# Patient Record
Sex: FEMALE | Race: WHITE | Employment: OTHER | URBAN - METROPOLITAN AREA
[De-identification: names, ages, dates, MRNs, and addresses within clinical notes are randomized per-mention and may not be internally consistent; named-entity substitution may affect disease eponyms.]

---

## 2017-01-01 ENCOUNTER — TELEPHONE (OUTPATIENT)
Dept: FAMILY MEDICINE | Facility: CLINIC | Age: 77
End: 2017-01-01

## 2017-01-01 ENCOUNTER — APPOINTMENT (OUTPATIENT)
Dept: CT IMAGING | Facility: CLINIC | Age: 77
DRG: 552 | End: 2017-01-01
Attending: NEUROLOGICAL SURGERY
Payer: MEDICARE

## 2017-01-01 ENCOUNTER — CARE COORDINATION (OUTPATIENT)
Dept: CARE COORDINATION | Facility: CLINIC | Age: 77
End: 2017-01-01

## 2017-01-01 ENCOUNTER — APPOINTMENT (OUTPATIENT)
Dept: GENERAL RADIOLOGY | Facility: CLINIC | Age: 77
DRG: 552 | End: 2017-01-01
Attending: NURSE PRACTITIONER
Payer: MEDICARE

## 2017-01-01 ENCOUNTER — APPOINTMENT (OUTPATIENT)
Dept: GENERAL RADIOLOGY | Facility: CLINIC | Age: 77
DRG: 552 | End: 2017-01-01
Attending: EMERGENCY MEDICINE
Payer: MEDICARE

## 2017-01-01 ENCOUNTER — HOSPITAL ENCOUNTER (INPATIENT)
Facility: CLINIC | Age: 77
LOS: 4 days | Discharge: NURSING, REHAB OR LONG-TERM CARE | DRG: 552 | End: 2017-03-10
Attending: EMERGENCY MEDICINE | Admitting: SURGERY
Payer: MEDICARE

## 2017-01-01 ENCOUNTER — APPOINTMENT (OUTPATIENT)
Dept: OCCUPATIONAL THERAPY | Facility: CLINIC | Age: 77
DRG: 552 | End: 2017-01-01
Attending: NURSE PRACTITIONER
Payer: MEDICARE

## 2017-01-01 ENCOUNTER — APPOINTMENT (OUTPATIENT)
Dept: PHYSICAL THERAPY | Facility: CLINIC | Age: 77
DRG: 552 | End: 2017-01-01
Attending: NURSE PRACTITIONER
Payer: MEDICARE

## 2017-01-01 ENCOUNTER — APPOINTMENT (OUTPATIENT)
Dept: CT IMAGING | Facility: CLINIC | Age: 77
DRG: 552 | End: 2017-01-01
Attending: EMERGENCY MEDICINE
Payer: MEDICARE

## 2017-01-01 ENCOUNTER — APPOINTMENT (OUTPATIENT)
Dept: PHYSICAL THERAPY | Facility: CLINIC | Age: 77
DRG: 552 | End: 2017-01-01
Payer: MEDICARE

## 2017-01-01 ENCOUNTER — DOCUMENTATION ONLY (OUTPATIENT)
Dept: OTHER | Facility: CLINIC | Age: 77
End: 2017-01-01

## 2017-01-01 VITALS
SYSTOLIC BLOOD PRESSURE: 142 MMHG | TEMPERATURE: 97.9 F | WEIGHT: 153.88 LBS | DIASTOLIC BLOOD PRESSURE: 69 MMHG | RESPIRATION RATE: 16 BRPM | HEIGHT: 67 IN | BODY MASS INDEX: 24.15 KG/M2 | OXYGEN SATURATION: 96 % | HEART RATE: 87 BPM

## 2017-01-01 DIAGNOSIS — J45.30 MILD PERSISTENT ASTHMA, UNCOMPLICATED: ICD-10-CM

## 2017-01-01 DIAGNOSIS — K59.01 SLOW TRANSIT CONSTIPATION: ICD-10-CM

## 2017-01-01 DIAGNOSIS — K59.03 DRUG-INDUCED CONSTIPATION: ICD-10-CM

## 2017-01-01 DIAGNOSIS — K21.9 GASTROESOPHAGEAL REFLUX DISEASE WITHOUT ESOPHAGITIS: ICD-10-CM

## 2017-01-01 DIAGNOSIS — M16.0 PRIMARY OSTEOARTHRITIS OF BOTH HIPS: ICD-10-CM
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
## 2017-01-01 DIAGNOSIS — G30.1 LATE ONSET ALZHEIMER'S DISEASE WITH BEHAVIORAL DISTURBANCE (H): ICD-10-CM

## 2017-01-01 DIAGNOSIS — M54.32 SCIATICA OF LEFT SIDE: Primary | ICD-10-CM

## 2017-01-01 DIAGNOSIS — G30.1 LATE ONSET ALZHEIMER'S DISEASE WITH BEHAVIORAL DISTURBANCE (H): Primary | ICD-10-CM

## 2017-01-01 DIAGNOSIS — M79.605 LEFT LEG PAIN: ICD-10-CM

## 2017-01-01 DIAGNOSIS — Z71.89 ACP (ADVANCE CARE PLANNING): Chronic | ICD-10-CM

## 2017-01-01 DIAGNOSIS — F02.818 LATE ONSET ALZHEIMER'S DISEASE WITH BEHAVIORAL DISTURBANCE (H): ICD-10-CM

## 2017-01-01 DIAGNOSIS — I67.9 CEREBROVASCULAR DISEASE: ICD-10-CM

## 2017-01-01 DIAGNOSIS — M79.604 RIGHT LEG PAIN: ICD-10-CM

## 2017-01-01 DIAGNOSIS — S32.049A CLOSED FRACTURE OF FOURTH LUMBAR VERTEBRA, UNSPECIFIED FRACTURE MORPHOLOGY, INITIAL ENCOUNTER (H): ICD-10-CM

## 2017-01-01 DIAGNOSIS — F02.818 LATE ONSET ALZHEIMER'S DISEASE WITH BEHAVIORAL DISTURBANCE (H): Primary | ICD-10-CM

## 2017-01-01 DIAGNOSIS — R42 VERTIGO: ICD-10-CM

## 2017-01-01 DIAGNOSIS — R42 VERTIGO: Primary | ICD-10-CM

## 2017-01-01 DIAGNOSIS — S32.000A COMPRESSION FX, LUMBAR SPINE, CLOSED, INITIAL ENCOUNTER (H): ICD-10-CM

## 2017-01-01 DIAGNOSIS — E55.9 VITAMIN D DEFICIENCY: ICD-10-CM

## 2017-01-01 LAB
ALBUMIN UR-MCNC: NEGATIVE MG/DL
ANION GAP SERPL CALCULATED.3IONS-SCNC: 9 MMOL/L (ref 3–14)
ANION GAP SERPL CALCULATED.3IONS-SCNC: 9 MMOL/L (ref 3–14)
APPEARANCE UR: CLEAR
BACTERIA #/AREA URNS HPF: ABNORMAL /HPF
BASOPHILS # BLD AUTO: 0 10E9/L (ref 0–0.2)
BASOPHILS NFR BLD AUTO: 0.1 %
BILIRUB UR QL STRIP: NEGATIVE
BUN SERPL-MCNC: 17 MG/DL (ref 7–30)
BUN SERPL-MCNC: 19 MG/DL (ref 7–30)
CALCIUM SERPL-MCNC: 9 MG/DL (ref 8.5–10.1)
CALCIUM SERPL-MCNC: 9.2 MG/DL (ref 8.5–10.1)
CHLORIDE SERPL-SCNC: 104 MMOL/L (ref 94–109)
CHLORIDE SERPL-SCNC: 106 MMOL/L (ref 94–109)
CO2 SERPL-SCNC: 26 MMOL/L (ref 20–32)
CO2 SERPL-SCNC: 27 MMOL/L (ref 20–32)
COLOR UR AUTO: ABNORMAL
CREAT SERPL-MCNC: 0.57 MG/DL (ref 0.52–1.04)
CREAT SERPL-MCNC: 0.58 MG/DL (ref 0.52–1.04)
DEPRECATED CALCIDIOL+CALCIFEROL SERPL-MC: 27 UG/L (ref 20–75)
DIFFERENTIAL METHOD BLD: ABNORMAL
EOSINOPHIL # BLD AUTO: 0 10E9/L (ref 0–0.7)
EOSINOPHIL NFR BLD AUTO: 0.1 %
ERYTHROCYTE [DISTWIDTH] IN BLOOD BY AUTOMATED COUNT: 14.4 % (ref 10–15)
ERYTHROCYTE [DISTWIDTH] IN BLOOD BY AUTOMATED COUNT: 14.4 % (ref 10–15)
GFR SERPL CREATININE-BSD FRML MDRD: ABNORMAL ML/MIN/1.7M2
GFR SERPL CREATININE-BSD FRML MDRD: NORMAL ML/MIN/1.7M2
GLUCOSE SERPL-MCNC: 105 MG/DL (ref 70–99)
GLUCOSE SERPL-MCNC: 96 MG/DL (ref 70–99)
GLUCOSE UR STRIP-MCNC: 30 MG/DL
HCT VFR BLD AUTO: 37.9 % (ref 35–47)
HCT VFR BLD AUTO: 37.9 % (ref 35–47)
HGB BLD-MCNC: 12.6 G/DL (ref 11.7–15.7)
HGB BLD-MCNC: 12.6 G/DL (ref 11.7–15.7)
HGB UR QL STRIP: NEGATIVE
IMM GRANULOCYTES # BLD: 0 10E9/L (ref 0–0.4)
IMM GRANULOCYTES NFR BLD: 0.3 %
KETONES UR STRIP-MCNC: NEGATIVE MG/DL
LEUKOCYTE ESTERASE UR QL STRIP: ABNORMAL
LYMPHOCYTES # BLD AUTO: 0.8 10E9/L (ref 0.8–5.3)
LYMPHOCYTES NFR BLD AUTO: 8.6 %
MAGNESIUM SERPL-MCNC: 2.4 MG/DL (ref 1.6–2.3)
MCH RBC QN AUTO: 31 PG (ref 26.5–33)
MCH RBC QN AUTO: 31.1 PG (ref 26.5–33)
MCHC RBC AUTO-ENTMCNC: 33.2 G/DL (ref 31.5–36.5)
MCHC RBC AUTO-ENTMCNC: 33.2 G/DL (ref 31.5–36.5)
MCV RBC AUTO: 93 FL (ref 78–100)
MCV RBC AUTO: 94 FL (ref 78–100)
MONOCYTES # BLD AUTO: 1 10E9/L (ref 0–1.3)
MONOCYTES NFR BLD AUTO: 10.1 %
MUCOUS THREADS #/AREA URNS LPF: PRESENT /LPF
NEUTROPHILS # BLD AUTO: 7.7 10E9/L (ref 1.6–8.3)
NEUTROPHILS NFR BLD AUTO: 80.8 %
NITRATE UR QL: NEGATIVE
NRBC # BLD AUTO: 0 10*3/UL
NRBC BLD AUTO-RTO: 0 /100
PH UR STRIP: 7 PH (ref 5–7)
PHOSPHATE SERPL-MCNC: 2.9 MG/DL (ref 2.5–4.5)
PLATELET # BLD AUTO: 120 10E9/L (ref 150–450)
PLATELET # BLD AUTO: 133 10E9/L (ref 150–450)
PLATELET # BLD AUTO: 135 10E9/L (ref 150–450)
PLATELET # BLD AUTO: 168 10E9/L (ref 150–450)
POTASSIUM SERPL-SCNC: 3.8 MMOL/L (ref 3.4–5.3)
POTASSIUM SERPL-SCNC: 4.1 MMOL/L (ref 3.4–5.3)
RADIOLOGIST FLAGS: ABNORMAL
RBC # BLD AUTO: 4.05 10E12/L (ref 3.8–5.2)
RBC # BLD AUTO: 4.06 10E12/L (ref 3.8–5.2)
RBC #/AREA URNS AUTO: 1 /HPF (ref 0–2)
SODIUM SERPL-SCNC: 140 MMOL/L (ref 133–144)
SODIUM SERPL-SCNC: 141 MMOL/L (ref 133–144)
SP GR UR STRIP: 1.01 (ref 1–1.03)
SQUAMOUS #/AREA URNS AUTO: <1 /HPF (ref 0–1)
TRANS CELLS #/AREA URNS HPF: <1 /HPF (ref 0–1)
URN SPEC COLLECT METH UR: ABNORMAL
UROBILINOGEN UR STRIP-MCNC: NORMAL MG/DL (ref 0–2)
WBC # BLD AUTO: 6.5 10E9/L (ref 4–11)
WBC # BLD AUTO: 9.5 10E9/L (ref 4–11)
WBC #/AREA URNS AUTO: 2 /HPF (ref 0–2)

## 2017-01-01 PROCEDURE — 12000003 ZZH R&B CRITICAL UMMC

## 2017-01-01 PROCEDURE — 25000132 ZZH RX MED GY IP 250 OP 250 PS 637: Mod: GY | Performed by: NURSE PRACTITIONER

## 2017-01-01 PROCEDURE — 36415 COLL VENOUS BLD VENIPUNCTURE: CPT | Performed by: NURSE PRACTITIONER

## 2017-01-01 PROCEDURE — 40000133 ZZH STATISTIC OT WARD VISIT: Performed by: OCCUPATIONAL THERAPIST

## 2017-01-01 PROCEDURE — 40000193 ZZH STATISTIC PT WARD VISIT

## 2017-01-01 PROCEDURE — 72131 CT LUMBAR SPINE W/O DYE: CPT

## 2017-01-01 PROCEDURE — 40000164 ZZH STATISTIC PHYSICIAN TLC VISIT: Performed by: INTERNAL MEDICINE

## 2017-01-01 PROCEDURE — 70450 CT HEAD/BRAIN W/O DYE: CPT

## 2017-01-01 PROCEDURE — 97530 THERAPEUTIC ACTIVITIES: CPT | Mod: GP

## 2017-01-01 PROCEDURE — 99207 ZZC CDG-CORRECTLY CODED, REVIEWED AND AGREE: CPT | Performed by: INTERNAL MEDICINE

## 2017-01-01 PROCEDURE — 25000128 H RX IP 250 OP 636: Performed by: NURSE PRACTITIONER

## 2017-01-01 PROCEDURE — 72125 CT NECK SPINE W/O DYE: CPT

## 2017-01-01 PROCEDURE — 73560 X-RAY EXAM OF KNEE 1 OR 2: CPT | Mod: 50

## 2017-01-01 PROCEDURE — A9270 NON-COVERED ITEM OR SERVICE: HCPCS | Mod: GY | Performed by: NURSE PRACTITIONER

## 2017-01-01 PROCEDURE — 72170 X-RAY EXAM OF PELVIS: CPT

## 2017-01-01 PROCEDURE — 85049 AUTOMATED PLATELET COUNT: CPT | Performed by: NURSE PRACTITIONER

## 2017-01-01 PROCEDURE — 85025 COMPLETE CBC W/AUTO DIFF WBC: CPT | Performed by: EMERGENCY MEDICINE

## 2017-01-01 PROCEDURE — 84100 ASSAY OF PHOSPHORUS: CPT | Performed by: NURSE PRACTITIONER

## 2017-01-01 PROCEDURE — 80048 BASIC METABOLIC PNL TOTAL CA: CPT | Performed by: NURSE PRACTITIONER

## 2017-01-01 PROCEDURE — 73590 X-RAY EXAM OF LOWER LEG: CPT | Mod: 50

## 2017-01-01 PROCEDURE — 99222 1ST HOSP IP/OBS MODERATE 55: CPT | Performed by: INTERNAL MEDICINE

## 2017-01-01 PROCEDURE — 97535 SELF CARE MNGMENT TRAINING: CPT | Mod: GO | Performed by: OCCUPATIONAL THERAPIST

## 2017-01-01 PROCEDURE — 81001 URINALYSIS AUTO W/SCOPE: CPT | Performed by: EMERGENCY MEDICINE

## 2017-01-01 PROCEDURE — 85027 COMPLETE CBC AUTOMATED: CPT | Performed by: NURSE PRACTITIONER

## 2017-01-01 PROCEDURE — 83735 ASSAY OF MAGNESIUM: CPT | Performed by: NURSE PRACTITIONER

## 2017-01-01 PROCEDURE — 97166 OT EVAL MOD COMPLEX 45 MIN: CPT | Mod: GO | Performed by: OCCUPATIONAL THERAPIST

## 2017-01-01 PROCEDURE — 99285 EMERGENCY DEPT VISIT HI MDM: CPT | Mod: 25 | Performed by: EMERGENCY MEDICINE

## 2017-01-01 PROCEDURE — 80048 BASIC METABOLIC PNL TOTAL CA: CPT | Performed by: EMERGENCY MEDICINE

## 2017-01-01 PROCEDURE — 99285 EMERGENCY DEPT VISIT HI MDM: CPT | Mod: Z6 | Performed by: EMERGENCY MEDICINE

## 2017-01-01 PROCEDURE — 97161 PT EVAL LOW COMPLEX 20 MIN: CPT | Mod: GP

## 2017-01-01 PROCEDURE — 73552 X-RAY EXAM OF FEMUR 2/>: CPT | Mod: 50

## 2017-01-01 PROCEDURE — 82306 VITAMIN D 25 HYDROXY: CPT | Performed by: NURSE PRACTITIONER

## 2017-01-01 PROCEDURE — 72100 X-RAY EXAM L-S SPINE 2/3 VWS: CPT

## 2017-01-01 RX ORDER — CLONAZEPAM 0.5 MG/1
0.5 TABLET ORAL EVERY 8 HOURS PRN
Status: DISCONTINUED | OUTPATIENT
Start: 2017-01-01 | End: 2017-01-01 | Stop reason: HOSPADM

## 2017-01-01 RX ORDER — POLYETHYLENE GLYCOL 3350 17 G/17G
17 POWDER, FOR SOLUTION ORAL DAILY
Status: DISCONTINUED | OUTPATIENT
Start: 2017-01-01 | End: 2017-01-01 | Stop reason: HOSPADM

## 2017-01-01 RX ORDER — ACETAMINOPHEN 325 MG/1
975 TABLET ORAL EVERY 8 HOURS
Status: DISCONTINUED | OUTPATIENT
Start: 2017-01-01 | End: 2017-01-01 | Stop reason: HOSPADM

## 2017-01-01 RX ORDER — ONDANSETRON 2 MG/ML
4 INJECTION INTRAMUSCULAR; INTRAVENOUS EVERY 6 HOURS PRN
Status: DISCONTINUED | OUTPATIENT
Start: 2017-01-01 | End: 2017-01-01 | Stop reason: HOSPADM

## 2017-01-01 RX ORDER — MAGNESIUM SULFATE HEPTAHYDRATE 40 MG/ML
4 INJECTION, SOLUTION INTRAVENOUS EVERY 4 HOURS PRN
Status: DISCONTINUED | OUTPATIENT
Start: 2017-01-01 | End: 2017-01-01 | Stop reason: HOSPADM

## 2017-01-01 RX ORDER — AMOXICILLIN 250 MG
2 CAPSULE ORAL 2 TIMES DAILY
Status: DISCONTINUED | OUTPATIENT
Start: 2017-01-01 | End: 2017-01-01 | Stop reason: HOSPADM

## 2017-01-01 RX ORDER — LIDOCAINE 50 MG/G
1-3 PATCH TOPICAL
Status: DISCONTINUED | OUTPATIENT
Start: 2017-01-01 | End: 2017-01-01 | Stop reason: HOSPADM

## 2017-01-01 RX ORDER — MEMANTINE HYDROCHLORIDE 10 MG/1
10 TABLET ORAL 2 TIMES DAILY
Qty: 180 TABLET | Refills: 3 | DISCHARGE
Start: 2017-01-01

## 2017-01-01 RX ORDER — OXYCODONE HYDROCHLORIDE 5 MG/1
5-10 TABLET ORAL EVERY 4 HOURS PRN
Qty: 60 TABLET | Refills: 0 | Status: SHIPPED | DISCHARGE
Start: 2017-01-01

## 2017-01-01 RX ORDER — CLONAZEPAM 0.5 MG/1
0.25 TABLET ORAL 2 TIMES DAILY PRN
Status: DISCONTINUED | OUTPATIENT
Start: 2017-01-01 | End: 2017-01-01

## 2017-01-01 RX ORDER — POLYETHYLENE GLYCOL 3350 17 G/17G
17 POWDER, FOR SOLUTION ORAL DAILY
Qty: 500 G | Refills: 11 | DISCHARGE
Start: 2017-01-01

## 2017-01-01 RX ORDER — ALBUTEROL SULFATE 90 UG/1
2 AEROSOL, METERED RESPIRATORY (INHALATION) EVERY 4 HOURS PRN
Status: DISCONTINUED | OUTPATIENT
Start: 2017-01-01 | End: 2017-01-01 | Stop reason: HOSPADM

## 2017-01-01 RX ORDER — GABAPENTIN 300 MG/1
300 CAPSULE ORAL AT BEDTIME
Status: DISCONTINUED | OUTPATIENT
Start: 2017-01-01 | End: 2017-01-01 | Stop reason: HOSPADM

## 2017-01-01 RX ORDER — ACETAMINOPHEN 325 MG/1
975 TABLET ORAL EVERY 8 HOURS
Qty: 100 TABLET | DISCHARGE
Start: 2017-01-01

## 2017-01-01 RX ORDER — MONTELUKAST SODIUM 10 MG/1
1 TABLET ORAL DAILY
Qty: 90 TABLET | Refills: 3 | DISCHARGE
Start: 2017-01-01

## 2017-01-01 RX ORDER — ONDANSETRON 4 MG/1
4-8 TABLET, ORALLY DISINTEGRATING ORAL EVERY 8 HOURS PRN
Qty: 30 TABLET | Refills: 0 | DISCHARGE
Start: 2017-01-01

## 2017-01-01 RX ORDER — CLONAZEPAM 0.5 MG/1
0.5 TABLET ORAL ONCE
Status: COMPLETED | OUTPATIENT
Start: 2017-01-01 | End: 2017-01-01

## 2017-01-01 RX ORDER — MECLIZINE HYDROCHLORIDE 25 MG/1
25 TABLET ORAL 3 TIMES DAILY PRN
Status: DISCONTINUED | OUTPATIENT
Start: 2017-01-01 | End: 2017-01-01 | Stop reason: HOSPADM

## 2017-01-01 RX ORDER — CALCITONIN SALMON 200 [IU]/.09ML
1 SPRAY, METERED NASAL DAILY
DISCHARGE
Start: 2017-01-01

## 2017-01-01 RX ORDER — ONDANSETRON 4 MG/1
4 TABLET, ORALLY DISINTEGRATING ORAL EVERY 6 HOURS PRN
Status: DISCONTINUED | OUTPATIENT
Start: 2017-01-01 | End: 2017-01-01 | Stop reason: HOSPADM

## 2017-01-01 RX ORDER — NALOXONE HYDROCHLORIDE 0.4 MG/ML
.1-.4 INJECTION, SOLUTION INTRAMUSCULAR; INTRAVENOUS; SUBCUTANEOUS
Status: DISCONTINUED | OUTPATIENT
Start: 2017-01-01 | End: 2017-01-01 | Stop reason: HOSPADM

## 2017-01-01 RX ORDER — HEPARIN SODIUM 5000 [USP'U]/.5ML
5000 INJECTION, SOLUTION INTRAVENOUS; SUBCUTANEOUS EVERY 12 HOURS
Status: DISCONTINUED | OUTPATIENT
Start: 2017-01-01 | End: 2017-01-01 | Stop reason: HOSPADM

## 2017-01-01 RX ORDER — MEMANTINE HYDROCHLORIDE 5 MG/1
10 TABLET ORAL 2 TIMES DAILY
Status: DISCONTINUED | OUTPATIENT
Start: 2017-01-01 | End: 2017-01-01 | Stop reason: HOSPADM

## 2017-01-01 RX ORDER — CLONAZEPAM 0.5 MG/1
0.5 TABLET ORAL 3 TIMES DAILY PRN
Qty: 60 TABLET | Refills: 0 | Status: SHIPPED | DISCHARGE
Start: 2017-01-01

## 2017-01-01 RX ORDER — BISACODYL 10 MG
10 SUPPOSITORY, RECTAL RECTAL DAILY PRN
Status: DISCONTINUED | OUTPATIENT
Start: 2017-01-01 | End: 2017-01-01

## 2017-01-01 RX ORDER — GABAPENTIN 300 MG/1
300 CAPSULE ORAL AT BEDTIME
Qty: 60 CAPSULE | DISCHARGE
Start: 2017-01-01

## 2017-01-01 RX ORDER — ALUMINA, MAGNESIA, AND SIMETHICONE 2400; 2400; 240 MG/30ML; MG/30ML; MG/30ML
30 SUSPENSION ORAL EVERY 4 HOURS PRN
Status: DISCONTINUED | OUTPATIENT
Start: 2017-01-01 | End: 2017-01-01 | Stop reason: HOSPADM

## 2017-01-01 RX ORDER — HALOPERIDOL 5 MG/ML
2 INJECTION INTRAMUSCULAR EVERY 6 HOURS PRN
Status: DISCONTINUED | OUTPATIENT
Start: 2017-01-01 | End: 2017-01-01 | Stop reason: HOSPADM

## 2017-01-01 RX ORDER — OXYCODONE HYDROCHLORIDE 5 MG/1
5 TABLET ORAL EVERY 4 HOURS PRN
Status: DISCONTINUED | OUTPATIENT
Start: 2017-01-01 | End: 2017-01-01

## 2017-01-01 RX ORDER — ALBUTEROL SULFATE 90 UG/1
2 AEROSOL, METERED RESPIRATORY (INHALATION) EVERY 4 HOURS PRN
Qty: 1 INHALER | Refills: 11 | DISCHARGE
Start: 2017-01-01

## 2017-01-01 RX ORDER — ALUMINA, MAGNESIA, AND SIMETHICONE 2400; 2400; 240 MG/30ML; MG/30ML; MG/30ML
30 SUSPENSION ORAL EVERY 4 HOURS PRN
Qty: 769 ML | Refills: 11 | DISCHARGE
Start: 2017-01-01

## 2017-01-01 RX ORDER — BISACODYL 10 MG
10 SUPPOSITORY, RECTAL RECTAL DAILY
Qty: 30 SUPPOSITORY | DISCHARGE
Start: 2017-01-01

## 2017-01-01 RX ORDER — LIDOCAINE 50 MG/G
2 PATCH TOPICAL EVERY 24 HOURS
Qty: 30 PATCH | DISCHARGE
Start: 2017-01-01

## 2017-01-01 RX ORDER — POTASSIUM CHLORIDE 29.8 MG/ML
20 INJECTION INTRAVENOUS
Status: DISCONTINUED | OUTPATIENT
Start: 2017-01-01 | End: 2017-01-01 | Stop reason: HOSPADM

## 2017-01-01 RX ORDER — OXYCODONE HYDROCHLORIDE 5 MG/1
5-10 TABLET ORAL EVERY 4 HOURS PRN
Status: DISCONTINUED | OUTPATIENT
Start: 2017-01-01 | End: 2017-01-01 | Stop reason: HOSPADM

## 2017-01-01 RX ORDER — CLONAZEPAM 0.5 MG/1
0.25 TABLET ORAL 2 TIMES DAILY PRN
Qty: 90 TABLET | Refills: 0 | Status: ON HOLD | OUTPATIENT
Start: 2017-01-01 | End: 2017-01-01

## 2017-01-01 RX ORDER — HALOPERIDOL 5 MG/ML
2 INJECTION INTRAMUSCULAR
Status: DISCONTINUED | OUTPATIENT
Start: 2017-01-01 | End: 2017-01-01

## 2017-01-01 RX ORDER — MECLIZINE HYDROCHLORIDE 25 MG/1
TABLET ORAL
Qty: 90 TABLET | Refills: 0 | Status: ON HOLD | OUTPATIENT
Start: 2017-01-01 | End: 2017-01-01

## 2017-01-01 RX ORDER — AMOXICILLIN 250 MG
2 CAPSULE ORAL 2 TIMES DAILY
Qty: 180 TABLET | Refills: 3 | DISCHARGE
Start: 2017-01-01

## 2017-01-01 RX ORDER — BISACODYL 10 MG
10 SUPPOSITORY, RECTAL RECTAL DAILY
Status: DISCONTINUED | OUTPATIENT
Start: 2017-01-01 | End: 2017-01-01 | Stop reason: HOSPADM

## 2017-01-01 RX ORDER — POTASSIUM CHLORIDE 750 MG/1
20-40 TABLET, EXTENDED RELEASE ORAL
Status: DISCONTINUED | OUTPATIENT
Start: 2017-01-01 | End: 2017-01-01 | Stop reason: HOSPADM

## 2017-01-01 RX ORDER — CLONAZEPAM 0.5 MG/1
0.25 TABLET ORAL EVERY 8 HOURS PRN
Status: DISCONTINUED | OUTPATIENT
Start: 2017-01-01 | End: 2017-01-01

## 2017-01-01 RX ORDER — POTASSIUM CHLORIDE 7.45 MG/ML
10 INJECTION INTRAVENOUS
Status: DISCONTINUED | OUTPATIENT
Start: 2017-01-01 | End: 2017-01-01 | Stop reason: HOSPADM

## 2017-01-01 RX ORDER — HEPARIN SODIUM 5000 [USP'U]/.5ML
5000 INJECTION, SOLUTION INTRAVENOUS; SUBCUTANEOUS EVERY 12 HOURS
Qty: 14 SYRINGE | DISCHARGE
Start: 2017-01-01

## 2017-01-01 RX ORDER — MECLIZINE HYDROCHLORIDE 25 MG/1
25 TABLET ORAL 3 TIMES DAILY PRN
Qty: 90 TABLET | Refills: 3 | DISCHARGE
Start: 2017-01-01

## 2017-01-01 RX ORDER — POTASSIUM CHLORIDE 1.5 G/1.58G
20-40 POWDER, FOR SOLUTION ORAL
Status: DISCONTINUED | OUTPATIENT
Start: 2017-01-01 | End: 2017-01-01 | Stop reason: HOSPADM

## 2017-01-01 RX ORDER — MONTELUKAST SODIUM 10 MG/1
10 TABLET ORAL DAILY
Status: DISCONTINUED | OUTPATIENT
Start: 2017-01-01 | End: 2017-01-01 | Stop reason: HOSPADM

## 2017-01-01 RX ORDER — PHENOL 1.4 %
600 AEROSOL, SPRAY (ML) MUCOUS MEMBRANE DAILY
Qty: 60 TABLET | DISCHARGE
Start: 2017-01-01 | End: 2017-01-01

## 2017-01-01 RX ORDER — CALCIUM CARBONATE 500(1250)
1250 TABLET ORAL 2 TIMES DAILY WITH MEALS
Status: DISCONTINUED | OUTPATIENT
Start: 2017-01-01 | End: 2017-01-01 | Stop reason: HOSPADM

## 2017-01-01 RX ORDER — BISACODYL 10 MG
10 SUPPOSITORY, RECTAL RECTAL ONCE
Status: COMPLETED | OUTPATIENT
Start: 2017-01-01 | End: 2017-01-01

## 2017-01-01 RX ORDER — CALCITONIN SALMON 200 [IU]/.09ML
1 SPRAY, METERED NASAL DAILY
Status: DISCONTINUED | OUTPATIENT
Start: 2017-01-01 | End: 2017-01-01 | Stop reason: HOSPADM

## 2017-01-01 RX ADMIN — ACETAMINOPHEN 975 MG: 325 TABLET, FILM COATED ORAL at 10:00

## 2017-01-01 RX ADMIN — POLYETHYLENE GLYCOL 3350 17 G: 17 POWDER, FOR SOLUTION ORAL at 08:39

## 2017-01-01 RX ADMIN — POLYETHYLENE GLYCOL 3350 17 G: 17 POWDER, FOR SOLUTION ORAL at 09:01

## 2017-01-01 RX ADMIN — OMEPRAZOLE 20 MG: 20 CAPSULE, DELAYED RELEASE ORAL at 09:02

## 2017-01-01 RX ADMIN — TRAMADOL HYDROCHLORIDE 25 MG: 50 TABLET, FILM COATED ORAL at 10:41

## 2017-01-01 RX ADMIN — HEPARIN SODIUM 5000 UNITS: 5000 INJECTION, SOLUTION INTRAVENOUS; SUBCUTANEOUS at 06:09

## 2017-01-01 RX ADMIN — TRAMADOL HYDROCHLORIDE 50 MG: 50 TABLET, FILM COATED ORAL at 14:37

## 2017-01-01 RX ADMIN — ACETAMINOPHEN 975 MG: 325 TABLET, FILM COATED ORAL at 09:02

## 2017-01-01 RX ADMIN — SENNOSIDES AND DOCUSATE SODIUM 2 TABLET: 8.6; 5 TABLET ORAL at 19:29

## 2017-01-01 RX ADMIN — TRAMADOL HYDROCHLORIDE 25 MG: 50 TABLET, FILM COATED ORAL at 08:35

## 2017-01-01 RX ADMIN — MEMANTINE HYDROCHLORIDE 10 MG: 5 TABLET, FILM COATED ORAL at 19:29

## 2017-01-01 RX ADMIN — BISACODYL 10 MG: 10 SUPPOSITORY RECTAL at 10:26

## 2017-01-01 RX ADMIN — MONTELUKAST SODIUM 10 MG: 10 TABLET, FILM COATED ORAL at 07:45

## 2017-01-01 RX ADMIN — TRAMADOL HYDROCHLORIDE 25 MG: 50 TABLET, FILM COATED ORAL at 22:30

## 2017-01-01 RX ADMIN — OXYCODONE HYDROCHLORIDE 5 MG: 5 TABLET ORAL at 14:07

## 2017-01-01 RX ADMIN — HEPARIN SODIUM 5000 UNITS: 5000 INJECTION, SOLUTION INTRAVENOUS; SUBCUTANEOUS at 20:35

## 2017-01-01 RX ADMIN — HEPARIN SODIUM 5000 UNITS: 5000 INJECTION, SOLUTION INTRAVENOUS; SUBCUTANEOUS at 18:12

## 2017-01-01 RX ADMIN — MEMANTINE HYDROCHLORIDE 10 MG: 5 TABLET, FILM COATED ORAL at 08:39

## 2017-01-01 RX ADMIN — TRAMADOL HYDROCHLORIDE 50 MG: 50 TABLET, FILM COATED ORAL at 04:17

## 2017-01-01 RX ADMIN — ACETAMINOPHEN 975 MG: 325 TABLET, FILM COATED ORAL at 16:38

## 2017-01-01 RX ADMIN — Medication 1 TABLET: at 19:29

## 2017-01-01 RX ADMIN — HEPARIN SODIUM 5000 UNITS: 5000 INJECTION, SOLUTION INTRAVENOUS; SUBCUTANEOUS at 17:13

## 2017-01-01 RX ADMIN — HEPARIN SODIUM 5000 UNITS: 5000 INJECTION, SOLUTION INTRAVENOUS; SUBCUTANEOUS at 04:43

## 2017-01-01 RX ADMIN — OXYCODONE HYDROCHLORIDE 5 MG: 5 TABLET ORAL at 15:12

## 2017-01-01 RX ADMIN — HEPARIN SODIUM 5000 UNITS: 5000 INJECTION, SOLUTION INTRAVENOUS; SUBCUTANEOUS at 17:22

## 2017-01-01 RX ADMIN — ACETAMINOPHEN 975 MG: 325 TABLET, FILM COATED ORAL at 00:10

## 2017-01-01 RX ADMIN — SENNOSIDES AND DOCUSATE SODIUM 2 TABLET: 8.6; 5 TABLET ORAL at 20:34

## 2017-01-01 RX ADMIN — GABAPENTIN 300 MG: 300 CAPSULE ORAL at 20:34

## 2017-01-01 RX ADMIN — SENNOSIDES AND DOCUSATE SODIUM 2 TABLET: 8.6; 5 TABLET ORAL at 09:01

## 2017-01-01 RX ADMIN — OXYCODONE HYDROCHLORIDE 5 MG: 5 TABLET ORAL at 10:26

## 2017-01-01 RX ADMIN — ACETAMINOPHEN 975 MG: 325 TABLET, FILM COATED ORAL at 08:35

## 2017-01-01 RX ADMIN — CLONAZEPAM 0.25 MG: 0.5 TABLET ORAL at 08:49

## 2017-01-01 RX ADMIN — CLONAZEPAM 0.25 MG: 0.5 TABLET ORAL at 16:36

## 2017-01-01 RX ADMIN — ACETAMINOPHEN 975 MG: 325 TABLET, FILM COATED ORAL at 17:22

## 2017-01-01 RX ADMIN — Medication 1 TABLET: at 08:39

## 2017-01-01 RX ADMIN — VITAMIN D, TAB 1000IU (100/BT) 2000 UNITS: 25 TAB at 09:02

## 2017-01-01 RX ADMIN — LIDOCAINE 2 PATCH: 50 PATCH TOPICAL at 20:35

## 2017-01-01 RX ADMIN — CLONAZEPAM 0.5 MG: 0.5 TABLET ORAL at 22:40

## 2017-01-01 RX ADMIN — POLYETHYLENE GLYCOL 3350 17 G: 17 POWDER, FOR SOLUTION ORAL at 07:54

## 2017-01-01 RX ADMIN — HEPARIN SODIUM 5000 UNITS: 5000 INJECTION, SOLUTION INTRAVENOUS; SUBCUTANEOUS at 04:23

## 2017-01-01 RX ADMIN — MONTELUKAST SODIUM 10 MG: 10 TABLET, FILM COATED ORAL at 08:39

## 2017-01-01 RX ADMIN — CLONAZEPAM 0.25 MG: 0.5 TABLET ORAL at 07:06

## 2017-01-01 RX ADMIN — DOCUSATE SODIUM 286 ML: 50 LIQUID ORAL at 13:57

## 2017-01-01 RX ADMIN — CLONAZEPAM 0.5 MG: 0.5 TABLET ORAL at 12:50

## 2017-01-01 RX ADMIN — MEMANTINE HYDROCHLORIDE 10 MG: 5 TABLET, FILM COATED ORAL at 09:02

## 2017-01-01 RX ADMIN — MEMANTINE HYDROCHLORIDE 10 MG: 5 TABLET, FILM COATED ORAL at 20:35

## 2017-01-01 RX ADMIN — VITAMIN D, TAB 1000IU (100/BT) 2000 UNITS: 25 TAB at 16:38

## 2017-01-01 RX ADMIN — SENNOSIDES AND DOCUSATE SODIUM 2 TABLET: 8.6; 5 TABLET ORAL at 08:39

## 2017-01-01 RX ADMIN — HEPARIN SODIUM 5000 UNITS: 5000 INJECTION, SOLUTION INTRAVENOUS; SUBCUTANEOUS at 09:03

## 2017-01-01 RX ADMIN — CALCIUM 1250 MG: 500 TABLET ORAL at 20:34

## 2017-01-01 RX ADMIN — SENNOSIDES AND DOCUSATE SODIUM 2 TABLET: 8.6; 5 TABLET ORAL at 07:45

## 2017-01-01 RX ADMIN — MEMANTINE HYDROCHLORIDE 10 MG: 5 TABLET, FILM COATED ORAL at 07:45

## 2017-01-01 RX ADMIN — LIDOCAINE 2 PATCH: 50 PATCH TOPICAL at 20:39

## 2017-01-01 RX ADMIN — OXYCODONE HYDROCHLORIDE 5 MG: 5 TABLET ORAL at 20:34

## 2017-01-01 RX ADMIN — ACETAMINOPHEN 375 MG: 325 TABLET, FILM COATED ORAL at 16:36

## 2017-01-01 RX ADMIN — ACETAMINOPHEN 975 MG: 325 TABLET, FILM COATED ORAL at 16:47

## 2017-01-01 RX ADMIN — OMEPRAZOLE 20 MG: 20 CAPSULE, DELAYED RELEASE ORAL at 08:39

## 2017-01-01 RX ADMIN — LIDOCAINE 3 PATCH: 50 PATCH TOPICAL at 19:29

## 2017-01-01 RX ADMIN — ACETAMINOPHEN 975 MG: 325 TABLET, FILM COATED ORAL at 10:14

## 2017-01-01 RX ADMIN — MEMANTINE HYDROCHLORIDE 10 MG: 5 TABLET, FILM COATED ORAL at 22:34

## 2017-01-01 RX ADMIN — CALCIUM 1250 MG: 500 TABLET ORAL at 09:03

## 2017-01-01 RX ADMIN — OXYCODONE HYDROCHLORIDE 5 MG: 5 TABLET ORAL at 09:50

## 2017-01-01 RX ADMIN — MONTELUKAST SODIUM 10 MG: 10 TABLET, FILM COATED ORAL at 09:03

## 2017-01-01 RX ADMIN — BISACODYL 10 MG: 10 SUPPOSITORY RECTAL at 09:01

## 2017-01-01 RX ADMIN — CLONAZEPAM 0.25 MG: 0.5 TABLET ORAL at 00:19

## 2017-01-01 RX ADMIN — OMEPRAZOLE 20 MG: 20 CAPSULE, DELAYED RELEASE ORAL at 07:45

## 2017-01-01 RX ADMIN — TRAMADOL HYDROCHLORIDE 25 MG: 50 TABLET, FILM COATED ORAL at 04:43

## 2017-01-01 RX ADMIN — CLONAZEPAM 0.25 MG: 0.5 TABLET ORAL at 18:00

## 2017-01-01 RX ADMIN — TRAMADOL HYDROCHLORIDE 25 MG: 50 TABLET, FILM COATED ORAL at 20:35

## 2017-01-01 ASSESSMENT — PAIN DESCRIPTION - DESCRIPTORS
DESCRIPTORS: SORE
DESCRIPTORS: SORE

## 2017-01-01 ASSESSMENT — ENCOUNTER SYMPTOMS
DYSURIA: 0
ARTHRALGIAS: 1
CONFUSION: 1
BACK PAIN: 1

## 2017-01-03 NOTE — TELEPHONE ENCOUNTER
[facility] nurse called requesting verbal approval to discontinue Rx for multivitamin. Also asking for clarifications on dose for tylenol directions read take 1-2 tablets 500 mg every 8 hous PRN. Facility doesn't  do dose ranges need to know if 1 or two tablets and how often. Per chart review tylenol not on active list. Please Advice.

## 2017-01-03 NOTE — TELEPHONE ENCOUNTER
Pt family mem would like to DC multivitamin, therapeutic with minerals (MULTI-VITAMIN) TABS due to only being able to have 8 medication at a time.  Advise to have home care fax over DC request.    Karen Rinaldi, TC

## 2017-01-03 NOTE — TELEPHONE ENCOUNTER
Verbal approval for discontinue multivitamin approved. Tylenol (acetaminophen) may be given at 500mg 1 tab every 4 hours as needed for pain or fever. Please advise them. Thank you! Nicole Joy Siegler, PA-C

## 2017-01-04 NOTE — TELEPHONE ENCOUNTER
Reason for Call:  Form, our goal is to have forms completed with 72 hours, however, some forms may require a visit or additional information.    Type of letter, form or note:  medical     Who is the form from?: [facility]    Where did the form come from: form was faxed in    What clinic location was the form placed at?: [facility]    Where the form was placed: Inbox of Nicole Siegler, PA     What number is listed as a contact on the form?: Fax # [phone]       Additional comments: D/C Multivitamin & Start Acetaminophen 500 mg    Call taken on 1/4/2017 at 12:43 PM by Sam Schaeffer

## 2017-01-10 NOTE — TELEPHONE ENCOUNTER
clonazePAM (KLONOPIN) 0.5 MG tablet      Last Written Prescription Date:  10/14/16  Last Fill Quantity: 90,   # refills: 0  Last Office Visit with [facility], [facility] or [facility] prescribing provider: 12/28/16  Future Office visit:       Routing refill request to provider for review/approval because:  Drug not on the [facility], [facility] or [facility] refill protocol or controlled substance

## 2017-01-11 NOTE — TELEPHONE ENCOUNTER
antivert Routing refill request to provider for review/approval because:  Checking if frequency of useis ok with Dr. Jose Alberto Muniz             Last Written Prescription Date: 10-14-16  Last Fill Quantity: 90,  # refills: 3   Last Office Visit with [facility], [facility] or [facility] prescribing provider: 12-28-16

## 2017-01-25 NOTE — TELEPHONE ENCOUNTER
Reason for call:  Patient reporting a symptom    Symptom or request: Sore throat, hoarse voice, temp 99.6     Duration (how long have symptoms been present): 1 day    Have you been treated for this before? No    Additional comments: Nurse is calling to find out what to do. Please call    Phone Number patient can be reached at:  Other phone number: [phone]    Best Time:  any    Can we leave a detailed message on this number:  YES    Call taken on 1/25/2017 at 10:36 AM by MARY MENA

## 2017-01-26 NOTE — TELEPHONE ENCOUNTER
Patient was called. Nurse Marilyn reports pt was seen at  yesterday.   Pt's POA was informed pt has appt scheduled today. She was transferred to [facility] to cancel appt.

## 2017-03-06 PROBLEM — S32.040A COMPRESSION FRACTURE OF L4 LUMBAR VERTEBRA: Status: ACTIVE | Noted: 2017-01-01

## 2017-03-06 NOTE — ED NOTES
BIBA, had a fall sometime over weekend. C/o low back pain and maybe right hip pain. LIves in an assisted care for dementia.

## 2017-03-06 NOTE — IP AVS SNAPSHOT
` `     [address] [facility] BANK: [phone]            Medication Administration Report for Tasha Rob as of 03/10/17 8310   Legend:    Given Hold Not Given Due Canceled Entry Other Actions    Time Time (Time) Time  Time-Action       Inactive    Active    Linked        Medications 03/04/17 03/05/17 03/06/17 03/07/17 03/08/17 03/09/17 03/10/17    acetaminophen (TYLENOL) tablet 975 mg  Dose: 975 mg Freq: EVERY 8 HOURS Route: PO  Start: 03/06/17 1700   Admin Instructions: Alternate ibuprofen (if ordered) with acetaminophen.  Maximum acetaminophen dose from all sources = 75 mg/kg/day not to exceed 4 grams/day.       1722 (975 mg)-Given        0010 (975 mg)-Given       1000 (975 mg)-Given       1636 (375 mg)-Given [C]        (0238)-Not Given              1014 (975 mg)-Given       1647 (975 mg)-Given        (0200)-Not Given       0835 (975 mg)-Given       1638 (975 mg)-Given        (0257)-Not Given       0902 (975 mg)-Given       [ ] 1700           albuterol (PROAIR HFA/PROVENTIL HFA/VENTOLIN HFA) Inhaler 2 puff  Dose: 2 puff Freq: EVERY 4 HOURS PRN Route: IN  PRN Reasons: shortness of breath / dyspnea,wheezing  Start: 03/06/17 1644              alum & mag hydroxide-simethicone (MYLANTA ES/MAALOX  ES) suspension 30 mL  Dose: 30 mL Freq: EVERY 4 HOURS PRN Route: PO  PRN Reasons: indigestion,heartburn  Start: 03/06/17 1644   Admin Instructions: Shake well.               bisacodyl (DULCOLAX) Suppository 10 mg  Dose: 10 mg Freq: DAILY Route: RE  Start: 03/10/17 0800          0901 (10 mg)-Given           calcium carbonate (OS-ROGELIO 500 mg [place]. Ca) tablet 1,250 mg  Dose: 1,250 mg Freq: 2 TIMES DAILY WITH MEALS Route: PO  Start: 03/09/17 1800   Admin Instructions: OS-ROGELIO 500 = 1250 mg calcium carbonate          2034 (1,250 mg)-Given        0903 (1,250 mg)-Given       [ ] 1800           cholecalciferol (vitamin D) tablet 2,000 Units  Dose: 2,000 Units Freq: DAILY Route: PO  Start: 03/09/17 1400         (5923)-Not Given        1638 (2,000 Units)-Given        0902 (2,000 Units)-Given           clonazePAM (klonoPIN) tablet 0.5 mg  Dose: 0.5 mg Freq: EVERY 8 HOURS PRN Route: PO  PRN Reason: anxiety  Start: 03/09/17 0833         2240 (0.5 mg)-Given        1250 (0.5 mg)-Given           gabapentin (NEURONTIN) capsule 300 mg  Dose: 300 mg Freq: AT BEDTIME Route: PO  Start: 03/08/17 2200        (2234)-Not Given        2034 (300 mg)-Given        [ ] 2000           haloperidol lactate (HALDOL) injection 2 mg  Dose: 2 mg Freq: EVERY 6 HOURS PRN Route: IV  PRN Reason: agitation  Start: 03/06/17 1800   Admin Instructions: For agitation which does not respond to Clonazepam.  Contact primary care team if agitated delirium persists               heparin sodium PF injection 5,000 Units  Dose: 5,000 Units Freq: EVERY 12 HOURS Route: SC  Start: 03/06/17 1700   Admin Instructions: HOLD heparin IF platelet count falls below 50% baseline or less than 100,000 /  L and notify provider. Use this product If CrCl less than 30 mL/min.       1722 (5,000 Units)-Given        0443 (5,000 Units)-Given       1812 (5,000 Units)-Given        0609 (5,000 Units)-Given       1713 (5,000 Units)-Given        0423 (5,000 Units)-Given       2035 (5,000 Units)-Given        0903 (5,000 Units)-Given       [ ] 2000           lidocaine (LIDODERM) 5 % Patch 1-3 patch  Dose: 1-3 patch Freq: EVERY 24 HOURS 2000 Route: TD  Start: 03/06/17 2000   Admin Instructions: Apply patch(s) to painful areas on back and left hip. To prevent lidocaine toxicity, patient should be patch free for 12 hrs daily. Patches may be cut to smaller size prior to removing release liner.  NEVER APPLY HEAT OVER PATCH which will increase absorption and may lead to risk of local anesthetic toxicity. Do not apply over area where liposomal bupivacaine was injected for 96 hours post injection.       1929 (3 patch)-Given        (2200)-Not Given        2039 (2 patch)-Given [C]        2035 (2 patch)-Given        [ ]  2000          And  lidocaine (LIDODERM) patch REMOVAL  Freq: EVERY 24 HOURS 0800 Route: TD  Start: 03/07/17 0800   Admin Instructions: Remove lidocaine Patch.        0754 ( )-Given        (0845)-Not Given        1045 ( )-Given        (0944)-Not Given [C]          And  lidocaine (LIDODERM) Patch in Place  Freq: EVERY 8 HOURS Route: TD  Start: 03/06/17 2000   Admin Instructions: Chart every shift, confirming that patch is still in place on patient (no barcode scan needed). See patch order for dose information.  NEVER APPLY HEAT OVER PATCH which will increase absorption and may lead to risk of local anesthetic toxicity. Do not apply over area where liposomal bupivacaine injected for 96 hours.       1935 ( )-Patch in Place        0344 ( )-Patch in Place       (1242)-Not Given       2036 ( )-Patch in Place               (1120)-Not Given       2234 ( )-Patch in Place        0406 ( )-Patch in Place       (1120)-Not Given       2048 (2 each)-Given [C]                      [ ] 2000           magnesium sulfate 4 g in 100 mL sterile water (premade)  Dose: 4 g Freq: EVERY 4 HOURS PRN Route: IV  PRN Reason: magnesium supplementation  Start: 03/06/17 1644   Admin Instructions: For serum Mg++ less than 1.6 mg/dL  Give 4 g and recheck magnesium level 2 hours after dose, and next AM.               meclizine (ANTIVERT) tablet 25 mg  Dose: 25 mg Freq: 3 TIMES DAILY PRN Route: PO  PRN Comment: dizziness  Start: 03/06/17 1644              memantine (NAMENDA) tablet 10 mg  Dose: 10 mg Freq: 2 TIMES DAILY Route: PO  Start: 03/06/17 2000      1929 (10 mg)-Given        0745 (10 mg)-Given       2035 (10 mg)-Given               2234 (10 mg)-Given        0839 (10 mg)-Given       2035 (10 mg)-Given        0902 (10 mg)-Given       [ ] 2000           montelukast (SINGULAIR) tablet 10 mg  Dose: 10 mg Freq: DAILY Route: PO  Start: 03/07/17 0800       0745 (10 mg)-Given                0839 (10 mg)-Given        0903 (10 mg)-Given           naloxone  (NARCAN) injection 0.1-0.4 mg  Dose: 0.1-0.4 mg Freq: EVERY 2 MIN PRN Route: IV  PRN Reason: opioid reversal  Start: 03/06/17 1644   Admin Instructions: For respiratory rate LESS than or EQUAL to 8.  Partial reversal dose:  0.1 mg titrated q 2 minutes for Analgesia Side Effects Monitoring Sedation Level of 3 (frequently drowsy, arousable, drifts to sleep during conversation).Full reversal dose:  0.4 mg bolus for Analgesia Side Effects Monitoring Sedation Level of 4 (somnolent, minimal or no response to stimulation).               omeprazole (priLOSEC) CR capsule 20 mg  Dose: 20 mg Freq: DAILY Route: PO  Start: 03/07/17 0800       0745 (20 mg)-Given                0839 (20 mg)-Given        0902 (20 mg)-Given           ondansetron (ZOFRAN-ODT) ODT tab 4 mg  Dose: 4 mg Freq: EVERY 6 HOURS PRN Route: PO  PRN Reason: nausea  Start: 03/06/17 1644   Admin Instructions: This is Step 1 of nausea and vomiting management.  If nausea not resolved in 15 minutes, go to Step 2 prochlorperazine (COMPAZINE). Do not push through foil backing. Peel back foil and gently remove. Place on tongue immediately. Administration with liquid unnecessary              Or  ondansetron (ZOFRAN) injection 4 mg  Dose: 4 mg Freq: EVERY 6 HOURS PRN Route: IV  PRN Reasons: nausea,vomiting  Start: 03/06/17 1644   Admin Instructions: This is Step 1 of nausea and vomiting management.  If nausea not resolved in 15 minutes, go to Step 2 prochlorperazine (COMPAZINE).  Irritant.               oxyCODONE (ROXICODONE) IR tablet 5-10 mg  Dose: 5-10 mg Freq: EVERY 4 HOURS PRN Route: PO  PRN Reason: moderate to severe pain  Start: 03/09/17 1226         1512 (5 mg)-Given       2034 (5 mg)-Given        (0943)-Not Given [C]       0950 (5 mg)-Given       1407 (5 mg)-Given           polyethylene glycol (MIRALAX/GLYCOLAX) Packet 17 g  Dose: 17 g Freq: DAILY Route: PO  Indications of Use: CONSTIPATION  Start: 03/07/17 0800   Admin Instructions: 1 Packet = 17 grams. Mixed  prescribed dose in 8 ounces of water.  1 Packet = 17 grams. Mixed prescribed dose in 8 ounces of water. Follow with 8 oz. of water.        0754 (17 g)-Given                0839 (17 g)-Given        0901 (17 g)-Given           potassium chloride (KLOR-CON) Packet 20-40 mEq  Dose: 20-40 mEq Freq: EVERY 2 HOURS PRN Route: ORAL OR FEED  PRN Reason: potassium supplementation  Start: 03/06/17 1644   Admin Instructions: Use if unable to tolerate tablets.  If Serum K+ 3.0-3.3, dose = 60 mEq po total dose (40 mEq x1 followed in 2 hours by 20 mEq x1). Recheck K+ level 4 hours after dose and the next AM.  If Serum K+ 2.5-2.9, dose = 80 mEq po total dose (40 mEq Q2H x2). Recheck K+ level 4 hours after dose and the next AM.  If Serum K+ less than 2.5, See IV order.  Dissolve packet contents in 4-8 ounces of cold water or juice.               potassium chloride 10 mEq in 100 mL intermittent infusion  Dose: 10 mEq Freq: EVERY 1 HOUR PRN Route: IV  PRN Reason: potassium supplementation  Start: 03/06/17 1644   Admin Instructions: Infuse via PERIPHERAL LINE or CENTRAL LINE. Use for central line replacement if patient weight less than 65 kg, if patient is on TPN with high potassium content or if unit does not stock 20 mEq bags.   If Serum K+ 3.0-3.3, dose = 10 mEq/hr x4 doses (40 mEq IV total dose). Recheck K+ level 2 hours after dose and the next AM.   If Serum K+ less than 3.0, dose = 10 mEq/hr x6 doses (60 mEq IV total dose). Recheck K+ level 2 hours after dose and the next AM.               potassium chloride 10 mEq in 100 mL intermittent infusion with 10 mg lidocaine  Dose: 10 mEq Freq: EVERY 1 HOUR PRN Route: IV  PRN Reason: potassium supplementation  Start: 03/06/17 1644   Admin Instructions: Infuse via PERIPHERAL LINE. Use potassium with lidocaine for pain with peripheral administration.  If Serum K+ 3.0-3.3, dose = 10 mEq/hr x4 doses (40 mEq IV total dose). Recheck K+ level 2 hours after dose and the next AM.  If Serum K+ less  than 3.0, dose = 10 mEq/hr x6 doses (60 mEq IV total dose). Recheck K+ level 2 hours after dose and the next AM.               potassium chloride 20 mEq in 50 mL intermittent infusion  Dose: 20 mEq Freq: EVERY 1 HOUR PRN Route: IV  PRN Reason: potassium supplementation  Start: 03/06/17 1644   Admin Instructions: Infuse via CENTRAL LINE Only. May need EKG if less than 65 kg or on TPN - Max rate is 0.3 mEq/kg/hr for patients not on EKG monitoring.   If Serum K+ 3.0-3.3, dose = 20 mEq/hr x2 doses (40 mEq IV total dose). Recheck K+ level 2 hours after dose and the next AM.  If Serum K+ less than 3.0, dose = 20 mEq/hr x3 doses (60 mEq IV total dose). Recheck K+ level 2 hours after dose and the next AM.               potassium chloride SA (K-DUR/KLOR-CON M) CR tablet 20-40 mEq  Dose: 20-40 mEq Freq: EVERY 2 HOURS PRN Route: PO  PRN Reason: potassium supplementation  Start: 03/06/17 1644   Admin Instructions: Use if able to take PO.   If Serum K+ 3.0-3.3, dose = 60 mEq po total dose (40 mEq x1 followed in 2 hours by 20 mEq x1). Recheck K+ level 4 hours after dose and the next AM.  If Serum K+ 2.5-2.9, dose = 80 mEq po total dose (40 mEq Q2H x2). Recheck K+ level 4 hours after dose and the next AM.  If Serum K+ less than 2.5, See IV order.  DO NOT CRUSH.               senna-docusate (SENOKOT-S;PERICOLACE) 8.6-50 MG per tablet 2 tablet  Dose: 2 tablet Freq: 2 TIMES DAILY Route: PO  Start: 03/06/17 2000      1929 (2 tablet)-Given        0745 (2 tablet)-Given       (2200)-Not Given               (2239)-Not Given        0839 (2 tablet)-Given       2034 (2 tablet)-Given        0901 (2 tablet)-Given       [ ] 2000          Future Medications  Medications 03/04/17 03/05/17 03/06/17 03/07/17 03/08/17 03/09/17 03/10/17       calcitonin (salmon) (MIACALCIN) nasal spray 1 spray  Dose: 1 spray Freq: DAILY Route: Alt nost  Start: 03/11/17 0800   End: 03/21/17 0759   Admin Instructions: Alternate nostrils daily.  Right nostril on even  days.  Left nostril on odd days.              Completed Medications  Medications 03/04/17 03/05/17 03/06/17 03/07/17 03/08/17 03/09/17 03/10/17         Dose: 10 mg Freq: ONCE Route: RE  Start: 03/09/17 1015   End: 03/09/17 1026         1026 (10 mg)-Given              Dose: 0.5 mg Freq: ONCE Route: PO  Start: 03/09/17 1300   End: 03/09/17 1250         1250 (0.5 mg)-Given              Dose: 286 mL Freq: ONCE Route: RE  Start: 03/09/17 1015   End: 03/09/17 1357         1357 (286 mL)-Given           Discontinued Medications  Medications 03/04/17 03/05/17 03/06/17 03/07/17 03/08/17 03/09/17 03/10/17         Dose: 10 mg Freq: DAILY PRN Route: RE  PRN Reason: constipation  PRN Comment: IF Miralax ineffective  Start: 03/06/17 1644   End: 03/09/17 1025   Admin Instructions: Hold for loose stools.          1025-Med Discontinued          Dose: 1 tablet Freq: 2 TIMES DAILY Route: PO  Start: 03/06/17 2000   End: 03/09/17 1354   Admin Instructions: Therapeutic interchange for calcium-vitamin D chew tab       1929 (1 tablet)-Given        (0745)-Not Given [C]       (2200)-Not Given               (2234)-Not Given        0839 (1 tablet)-Given       1354-Med Discontinued          Dose: 0.25 mg Freq: EVERY 8 HOURS PRN Route: PO  PRN Reason: anxiety  Start: 03/07/17 1500   End: 03/09/17 0833       1636 (0.25 mg)-Given        1800 (0.25 mg)-Given        0706 (0.25 mg)-Given       0833-Med Discontinued          Dose: 5 mg Freq: EVERY 4 HOURS PRN Route: PO  PRN Reason: moderate to severe pain  Start: 03/09/17 1009   End: 03/09/17 1226         1026 (5 mg)-Given       1226-Med Discontinued          Dose: 25 mg Freq: EVERY 6 HOURS PRN Route: PO  PRN Reason: moderate to severe pain  Start: 03/06/17 1644   End: 03/08/17 1420   Admin Instructions: Use if tylenol and lidocaine patches are not effective       2230 (25 mg)-Given        0443 (25 mg)-Given       1041 (25 mg)-Given       2035 (25 mg)-Given        0835 (25 mg)-Given       1420-Med  Discontinued           Dose: 25-50 mg Freq: EVERY 6 HOURS PRN Route: PO  PRN Reason: moderate to severe pain  Start: 03/08/17 1420   End: 03/09/17 1015   Admin Instructions: Use if tylenol and lidocaine patches are not effective         1437 (50 mg)-Given        0417 (50 mg)-Given       1015-Med Discontinued     Medications 03/04/17 03/05/17 03/06/17 03/07/17 03/08/17 03/09/17 03/10/17

## 2017-03-06 NOTE — CONSULTS
[facility]       NEUROSURGERY CONSULTATION NOTE    This consultation was requested by Crystal Sierra from the trauma service.    Reason for Consultation:   New L4 compression fracture; progression of L3 compression fracture     HPI:  Ms. Rob is a 75 yo female with h/o vertigo, chronic lumbar pain, osteoperosis, and Alzheimer's disease who presents from her care facility with acute on chronic back pain with unwitnessed fall (history obtained from chart as patient is extremely unreliable historian and no witnesses were present to provide history).  She reports significant pain in her left hip as well as pain in back when she turns in the bed.  She takes ASA 81.          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       Allergies:  Allergies   Allergen Reactions     Codeine Sulfate      Darvon [Propoxyphene Hcl]      Declomycin [Demeclocycline Hcl]      Fosamax GI Disturbance     Iodine      Ivp Dye [Contrast Dye]      Premarin [Conjugated Estrogens]   "    Seafood      Sulfa Drugs          PE:  Blood pressure 150/71, temperature 97.7  F (36.5  C), temperature source Oral, resp. rate 18, height 1.702 m (5' 7\"), weight 69.8 kg (153 lb 14.1 oz), SpO2 95 %, not currently breastfeeding.  Gen: Elderly female laying in bed, not in acute distress  MS: A&Ox1, Speech fluent and conversant however nonsensical at times  CN: Pupils round and reactive, extraocular movements intact, face symmetric, tongue midline, uvula & palate elevate symmetrically   Motor: 5/5 in b/l UE, appears to be full strength in BLE, however, effort is not consistent  Reflexes: 2/4 in b/l UE& LEs  Sensory: intact to light touch   No drift  Pain with palpation of paraspinous muscles throughout thoracic and lumbar spine, possibly midline tenderness to palpation in thoracic and lumbar spine as well.    Non labored breathing on RA    Labs:  Plts: 120  BMP: unremarkable  UA: negative     Imaging:   CT c-spine: no acute fractures  CT head: no acute intracranial pathology   AP/Lateral XR of L-spine: superior endplate fractures of L3, L4 new since 2016.       ASSESSMENT:   Ms. Rob is a 77 yo female with h/o osteoporosis, Alzheimer's disease and recent fall found to have superior endplate compression fractures of L3 and L4 since 2016.  Patient is extremely confused and cannot describe any pain when she is sitting in bed.  She appears to be full strength with intact sensation in lower extremities.  Recommend conservative management for pain.        RECOMMENDATIONS:  - No neurosurgical intervention indicated at this time   - CT of L-spine  - Chair back brace for comfort  - Pain control     The patient was discussed with Dr. Dotson, neurosurgery chief resident, and she agrees with the above.    Freeman Daniel MD,PhD  Neurosurgery PGY-1    Please contact neurosurgery resident on call with questions.    Dial * * [phone], enter [phone] when prompted.     "

## 2017-03-06 NOTE — H&P
[facility], [place]    History and Physical / Consult note: Trauma Service     Date of Admission:  3/6/2017  Date of Service (when I saw the patient): 03/06/17    Assessment & Plan   Trauma mechanism:Presumed fall from standing   Time/date of injury: unknown; possibly Friday night   Known Injuries:  1. L4 compression fracture   2. Progression of L3 compression fracture since previous exam   Other diagnoses:   1. Acute pain   2. Alzheimer's Dementia  3. CVA  4. HLD  5. HTN  6. Chronic low back pain/sciatica  7. Generalized anxiety disorder  8. GERD   9. Vertigo  10. Chronic constipation       Procedure:  None at this time    Plan:  1. Admit to Trauma, 7B, Dr. Stinson  2. Neurosurgery consult: L4 superior endplate compression fracture which is new since last imaging in May, 2016. Also now with progression of L3 superior endplate compression deformity.  It is unclear how acute these injuries are, given patient's severe memory loss.  Patient does have known history of chronic low back pain/sciatica, osteopenia, and degenerative changes of lumbar spine.  Given acute onset of difficulty with movement/ambulation and increase in pain, will place on lumbar spinal precautions and bedrest until seen by NSG.    3. Ortho consult: No definitive evidence of acute fractures of lower extremities/hips.  Patient has been having chronic pain/sciatica in left hip and, according to friend Thalia, who accompanies patient in ED,  recently received an injection in left hip which was very helpful in relieving pain.  Ortho to review imaging and provide further direction/recommendations re: further imaging.   4. Alzheimer's dementia: memory loss appears to be quite progressed.  Patient is entirely disoriented to time, place, situation on exam.  Continue PTA Namenda.  May need bedside attendant for safety given patient history of recent falls.   5. Anxiety/agitation: PTA Clonazepam available PRN. Will make PRN Haldol  "available in the event of agitation.  Patient has a history of paranoid delusions, particularly at night.    6. No immediate operative plan. Ok for diet.   7. PT/OT: eval and treat as indicated. Patient currently is in an \"assisted living\" type of memory care facility with an independent apartment and help with meds, meals, etc.  Will likely need escalation of care     Code status: DNR/DNI confirmed with patient/friend/POLST     General Cares:  GI Prophylaxis: Resume PTA omeprazole  DVT Prophylaxis: sq heparin  Date of last stool/Bowel Regimen:unknown. Resume PTA senna/miralax daily, PRN dulcolax suppository   Pulmonary toilet:IS; PRN albuterol inhaler     ETOH: This patient was asked if in the last 3-6 months there has been a time when she had 4 or more drinks in a single day/outing.. Patient answer to the screening question was in the negative. No intervention needed.  Primary Care Physician   Jose Alberto Muniz    Chief Complaint   Back pain and left hip pain     History is obtained from the patient and patient's advocate    History of Present Illness   Tasha Rob is a 76 year old female who with history of advanced Alzheimer's dementia, chronic low back pain with degenerative changes in lumbar spine, sciatica, GERD, HTN, and anxiety who presents to [facility] ED from assisted living/memory care on 3/6 with back and hip pain.  The patient is unable to provide a history given dementia.  History is obtained from patient's friend Thalia, who is also power of .  According to Thalia, the patient apparently told staff on Saturday morning that she had fallen on Friday night.  On Saturday the patient was complaining of increased hip pain. Of note, the patient has chronic left hip pain, and had an injection approximately a month ago which significantly reduced pain.  This morning, patient was experiencing increased back and hip pain, which was apparently severe and limiting ambulation and ability to toilet.   Patient is " not able to accurately recall recent events and it is unclear if there were any recent falls, but given increase in pain, patient was brought in by ambulance for evaluation.  Imaging was obtained and patient was found to have an L4 compression fracture which was new since previous spinal imaging in May of 2016.  There was also extension of L3 compression fracture which was seen at that time.  Further imaging revealed no definite fractures of hips/femurs, but MRI was recommended if high suspicion of fracture given degree of osteopenia.   Orthopedics and neurosurgery were consulted.    CT head/cervical spine were negative for acute pathology.  Patient denies headache, neck pain, or visual changes.  She does endorse mild tenderness to palpation of spine in lower thoracic/upper lumbar region as well as tenderness to palpation over left hip.  No other painful areas elicited on exam.     Ms. Rob will be admitted to the Trauma service for management of compression fractures and pain control. She will likely not be able to return to her independent apartment given recent falls and now decreased mobility.  Of note, the patient has a history of paranoid delusions, particularly at night.  Patient may been bedside attendant for safety while admitted.      Past Medical History    I have reviewed this patient's medical history and updated it with pertinent information if needed.   Past Medical History   Diagnosis Date     Asthma      Gastro-oesophageal reflux disease      High cholesterol      Unspecified cerebral artery occlusion with cerebral infarction        Past Surgical History   I have reviewed this patient's surgical history and updated it with pertinent information if needed.  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 Taking?   STATIN NOT PRESCRIBED, INTENTIONAL,   No No   Si each daily Statin not prescribed intentionally due to Intolerance (with supporting documentation of trying a statin at least once within the last 5 years)   albuterol (ALBUTEROL) 108 (90 BASE) MCG/ACT inhaler   No No   Sig: Inhale 2 puffs into the lungs every 4 hours as needed for shortness of breath / dyspnea or wheezing   alum & mag hydroxide-simethicone (MYLANTA ES/MAALOX  ES) 400-400-40 MG/5ML SUSP   No No   Sig: Take 30 mLs by mouth every 4 hours as needed for indigestion or heartburn   aspirin 162 MG EC tablet   No No   Sig: Take 1 tablet (162 mg) by mouth daily   bisacodyl (DULCOLAX) 5 MG EC tablet   No No   Sig: Take 1 tablet (5 mg) by mouth daily as needed for constipation   calcium Citrate-vitamin D 500-400 MG-UNIT CHEW   No No   Sig: Take 500 mg by mouth 2 times daily   clonazePAM (KLONOPIN) 0.5 MG tablet   No No   Sig: Take 0.5 tablets (0.25 mg) by mouth 2 times daily as needed for anxiety   meclizine (ANTIVERT) 25 MG tablet   No No   Sig: Take 1 tablet (25 mg) by mouth 3 times daily as needed   meclizine (ANTIVERT) 25 MG tablet   No No   Sig: TAKE 1 TABLET BY MOUTH THREE TIMES DAILY AS NEEDED   memantine (NAMENDA) 10 MG tablet   No No   Sig: Take 1 tablet (10 mg) by mouth 2 times daily   montelukast (SINGULAIR) 10 MG tablet   No No   Sig: Take 1 tablet (10 mg) by mouth daily   multivitamin, therapeutic with minerals (MULTI-VITAMIN) TABS   No No   Sig: Take 1 tablet by mouth daily   omeprazole (PRILOSEC) 20 MG capsule   No No   Sig: Take 1 capsule (20 mg) by mouth daily   ondansetron (ZOFRAN ODT) 4 MG disintegrating tablet   No No   Sig: Take 1-2 tablets (4-8 mg) by mouth every 8 hours as needed for nausea or vomiting   polyethylene glycol (MIRALAX) powder   No No   Sig: Take 17 g by mouth daily   senna-docusate (DELLA-COLACE) 8.6-50 MG  per tablet   No No   Sig: Take 2 tablets by mouth 2 times daily      Facility-Administered Medications: None     Allergies   Allergies   Allergen Reactions     Codeine Sulfate      Darvon [Propoxyphene Hcl]      Declomycin [Demeclocycline Hcl]      Fosamax GI Disturbance     Iodine      Ivp Dye [Contrast Dye]      Premarin [Conjugated Estrogens]      Seafood      Sulfa Drugs        Social History   Social History     Social History     Marital status: Single     Spouse name: N/A     Number of children: N/A     Years of education: N/A     Occupational History     Not on file.     Social History Main Topics     Smoking status: Never Smoker     Smokeless tobacco: Never Used     Alcohol use No     Drug use: No     Sexual activity: No     Other Topics Concern     Parent/Sibling W/ Cabg, Mi Or Angioplasty Before 65f 55m? No     Social History Narrative       Family History   Family history reviewed with patient and is noncontributory.    Review of Systems : unable to perform given severity of dementia     Physical Exam   Temp: 98.2  F (36.8  C) Temp src: Oral BP: 157/79   Heart Rate: 90   SpO2: 97 % O2 Device: None (Room air)    Vital Signs with Ranges  Temp:  [98.2  F (36.8  C)] 98.2  F (36.8  C)  Heart Rate:  [90] 90  BP: (111-157)/(56-79) 157/79  SpO2:  [96 %-100 %] 97 % 0 lbs 0 oz    Primary Survey:  Airway: patient talking  Breathing: symmetric respiratory effort bilaterally  Circulation: central pulses present and peripheral pulses present  Disability: Pupils - left 4 mm and brisk, right 4 mm and brisk   [place] Coma Scale - Total 14/15 (V4)   Eye Response (E): 4= spontaneous,  3= to verbal/voice, 2=  to pain, 1= No response   Verbal Response (V):  5= Orientated, converses,  4= Confused, converses, 3= Inappropriate words,  2= Incomprehensible sounds,  1=No response   Motor Response (M)  6= Obeys commands, 5= Localizes to pain, 4= Withdrawal to pain, 3=Fexion to pain, 2= Extension to pain, 1= No response    Secondary  Survey:  General: alert, disoriented to situation, place, time  Head: atraumatic, normocephalic, trachea midline  Eyes: PERRLA, pupils 4mm, EOMI, corneas and conjunctivae clear  Ears: non-inflamed external ear canals  Nose: nares patent, no drainage, nasal septum non-tender  Mouth/Throat: no exudates or erythema,  no dental tenderness or malocclusions, no tongue lacerations  Neck:  No cervical collar present. No midline posterior tenderness, full AROM without pain or tenderness   Chest/Pulmonary: normal respiratory rate and rhythm,  bilateral clear breath sounds, no wheezes, rales or rhonchi, no chest wall tenderness or deformities,   Cardiovascular: S1, S2,  normal and regular rate and rhythm, no murmurs  Abdomen: soft, non-tender, no guarding, no rebound tenderness and no tenderness to palpation  :  no turk, no urine assess  Back/Spine: no deformity, mild midline tenderness of low thoracic/upper lumbar region of spine, no sacral tenderness,  no step-offs and no abrasions or contusions  Musculoskel/Extremities: normal extremities, full AROM of upper extremities without tenderness, edema, erythema, ecchymosis, or abrasions. Left hip painful to palpation.  ROM not fully assessed due to pain.  2 PP. No edema.   Hand: no gross deformities of hands or fingers. Full AROM of hand and fingers in flexion and extension.  strength equal and symmetric.   Skin: no rashes, laceration, ecchymosis, skin warm and dry.   Neuro: PERRLA, alert, disoriented x 3. CN II-XII grossly intact. No focal deficits. Strength 5/5 x 4 extremities.  Sensation intact.   Psychiatric:cooperative with exam.      Data   UA RESULTS:  Recent Labs   Lab Test  03/06/17   1328  12/28/16   1544   COLOR  Light Yellow  Yellow   APPEARANCE  Clear  Clear   URINEGLC  30*  Negative   URINEBILI  Negative  Negative   URINEKETONE  Negative  Negative   SG  1.009  >1.030   UBLD  Negative  Negative   URINEPH  7.0  5.5   PROTEIN  Negative  30*   UROBILINOGEN   --    0.2   NITRITE  Negative  Negative   LEUKEST  Small*  Small*   RBCU  1  2-5*   WBCU  2  5-10*      Results for orders placed or performed during the hospital encounter of 03/06/17 (from the past 24 hour(s))   Head CT w/o contrast    Narrative    CT HEAD W/O CONTRAST 3/6/2017 12:38 PM    History: trauma    Comparison: Brain MRI 8/17/2013.    Technique: Using multidetector thin collimation helical acquisition  technique, axial, coronal and sagittal CT images from the skull base  to the vertex were obtained without intravenous contrast.     Findings:    No significant change in multiple chronic right cerebellar hemisphere  and bilateral (left greater than right) basal ganglia lacunar  infarcts. No new loss of gray-white matter differentiation. Stable  mild generalized cerebral and cerebellar parenchymal volume loss.  Ventricles are not enlarged out of proportion to the cerebral sulci.  No intracranial hemorrhage, mass effect, midline shift or abnormal  extra axial fluid collection.    Calvarium is intact. Paranasal sinuses and mastoid air cells are  clear.      Impression    Impression:   1. No acute intracranial pathology.  2. Stable moderate chronic small vessel ischemic disease and mild  generalized parenchymal volume loss.    CHANDRA DICKSON MD   Cervical spine CT w/o contrast    Narrative    CT CERVICAL SPINE W/O CONTRAST 3/6/2017 12:38 PM    Provided History: pain/trauma    Comparison: None available.    Technique: Using multidetector thin collimation helical acquisition  technique, axial, coronal and sagittal CT images through the cervical  spine were obtained without intravenous contrast.     Findings:    Normal alignment of the cervical vertebrae. No fracture or traumatic  subluxation. No abnormal prevertebral soft tissue swelling.    Normal cervical lordosis. Exaggeration of the normal thoracic  kyphosis. Degenerative fusion of the posterior elements bilaterally at  C2-C3 and C4-C6. Multilevel disc  degeneration with loss of multilevel  facet hypertrophy and uncinate spurring, which results in moderate  right neural foraminal stenosis at C6-C7. Disc height, disc  calcification and opposing endplate sclerosis at C6-C7. Spinal canal  is patent. Paraspinous soft tissues are normal. Visualized lung apices  are clear.      Impression    IMPRESSION:  1. No evidence of fracture or traumatic subluxation of the cervical  spine.  2. Multilevel cervical spondylosis.    CHANDRA DICKSON MD   Lumbar spine XR, 2-3 views   Result Value Ref Range    Radiologist flags (Urgent)      New L4 superior endplate compression fractures since    Narrative    2 views lumbar spine radiographs    History: trauma/pain    Comparison: 5/31/2016    Findings:    AP and lateral  views of the lumbar spine were obtained.    5  lumbar type vertebral bodies are assumed for the purpose of this  dictation. Posterior elements of spine are not well assessed due to  underpenetration and incomplete inclusion in the field-of-view.    Since comparison study from May 2016, new superior endplate  compression fracture of L4. The superior endplate compression  deformity of L3 has also progressed.    Multilevel degenerative changes of spine with moderate to severe disc  space loss at L5-S1. Trace anterolisthesis of L4 on L5.    Bones appear osteopenic. Atherosclerotic calcification of aorta.      Impression    IMPRESSION:  1. Since May 2016, new L4 superior endplate compression fracture and  progressed L3 superior endplate compression fracture.  2. Osteopenia.    [Urgent Result: New L4 superior endplate compression fractures since  comparison.]    Finding was identified on 3/6/2017 1:16 PM.     Dr. Beaver was contacted by Dr. Hair at 3/6/2017 1:34 PM and  verbalized understanding of the urgent finding.     STEPHANY HAIR   Pelvis XR, 1-2 views    Narrative    Exam: Single AP view of pelvis radiograph    HISTORY: Pain/trauma.    FINDINGS:    Single AP  view of pelvis demonstrates no acute osseous abnormality.    Degenerative changes of visualized lumbar spine. Sacrum and coccyx are  obscured by moderate to large amount of fecal content in bowel gas.  Bones appear osteopenic.    Mild to moderate degenerative changes of bilateral hips with  associated joint space loss superiomedially. Enthesopathic changes of  the pelvis and trochanters. Pelvic phlebolith.      Impression    IMPRESSION:  1. No acute osseous abnormality.  2. Osteopenic appearance.    [facility]   Basic metabolic panel   Result Value Ref Range    Sodium 141 133 - 144 mmol/L    Potassium 4.1 3.4 - 5.3 mmol/L    Chloride 106 94 - 109 mmol/L    Carbon Dioxide 26 20 - 32 mmol/L    Anion Gap 9 3 - 14 mmol/L    Glucose 105 (H) 70 - 99 mg/dL    Urea Nitrogen 19 7 - 30 mg/dL    Creatinine 0.57 0.52 - 1.04 mg/dL    GFR Estimate >90  Non  GFR Calc   >60 mL/min/1.7m2    GFR Estimate If Black >90   GFR Calc   >60 mL/min/1.7m2    Calcium 9.0 8.5 - 10.1 mg/dL   CBC with platelets differential   Result Value Ref Range    WBC 9.5 4.0 - 11.0 10e9/L    RBC Count 4.05 3.8 - 5.2 10e12/L    Hemoglobin 12.6 11.7 - 15.7 g/dL    Hematocrit 37.9 35.0 - 47.0 %    MCV 94 78 - 100 fl    MCH 31.1 26.5 - 33.0 pg    MCHC 33.2 31.5 - 36.5 g/dL    RDW 14.4 10.0 - 15.0 %    Platelet Count 133 (L) 150 - 450 10e9/L    Diff Method Automated Method     % Neutrophils 80.8 %    % Lymphocytes 8.6 %    % Monocytes 10.1 %    % Eosinophils 0.1 %    % Basophils 0.1 %    % Immature Granulocytes 0.3 %    Nucleated RBCs 0 0 /100    Absolute Neutrophil 7.7 1.6 - 8.3 10e9/L    Absolute Lymphocytes 0.8 0.8 - 5.3 10e9/L    Absolute Monocytes 1.0 0.0 - 1.3 10e9/L    Absolute Eosinophils 0.0 0.0 - 0.7 10e9/L    Absolute Basophils 0.0 0.0 - 0.2 10e9/L    Abs Immature Granulocytes 0.0 0 - 0.4 10e9/L    Absolute Nucleated RBC 0.0    UA with Microscopic   Result Value Ref Range    Color Urine Light Yellow     Appearance  Urine Clear     Glucose Urine 30 (A) NEG mg/dL    Bilirubin Urine Negative NEG    Ketones Urine Negative NEG mg/dL    Specific Gravity Urine 1.009 1.003 - 1.035    Blood Urine Negative NEG    pH Urine 7.0 5.0 - 7.0 pH    Protein Albumin Urine Negative NEG mg/dL    Urobilinogen mg/dL Normal 0.0 - 2.0 mg/dL    Nitrite Urine Negative NEG    Leukocyte Esterase Urine Small (A) NEG    Source Midstream Urine     WBC Urine 2 0 - 2 /HPF    RBC Urine 1 0 - 2 /HPF    Bacteria Urine Few (A) NEG /HPF    Squamous Epithelial /HPF Urine <1 0 - 1 /HPF    Transitional Epi <1 0 - 1 /HPF    Mucous Urine Present (A) NEG /LPF   XR Femur Bilateral 2 Views    Narrative    Exam: 2 views bilateral femur, 2 views bilateral tibia/fibular  radiographs    HISTORY: Pain/trauma.    COMPARISON: None.    AP and lateral views of the each femur and tibia/fibular were  obtained.    Bones appear osteopenic.    Right:    Degenerative changes of right hip. Mild contour irregularity of the  medial aspect of the femoral head neck junction on frog leg lateral  views are presumably related to osteophyte. Degenerative changes of  right knee.    Vascular calcifications. Plantar calcaneal enthesophyte.    Left: Degenerative changes of the left hip. Mild contour irregularity  of the medial aspect of the femoral head neck junction on frog leg  lateral view are presumably related to osteophyte. Degenerative  changes of left knee.    Bony proliferation of medial malleolus, in keeping with remote trauma.  Vascular calcifications.      Impression    IMPRESSION:  1. No definite evidence of acute osseous abnormality in either femur,  tibia, or fibula.  2. Mild contour irregularity of the medial aspect of the femoral head  neck junction on frog leg lateral views are presumably related to  osteophyte. However, if high clinical suspicion of hip fracture,  consider MRI given underlying osteopenia.    Findings discussed with Dr. Beaver by Dr. Hair.    STEPHANY HAIR    XR Tibia & Fibula Bilateral 2 Views    Narrative    Exam: 2 views bilateral femur, 2 views bilateral tibia/fibular  radiographs    HISTORY: Pain/trauma.    COMPARISON: None.    AP and lateral views of the each femur and tibia/fibular were  obtained.    Bones appear osteopenic.    Right:    Degenerative changes of right hip. Mild contour irregularity of the  medial aspect of the femoral head neck junction on frog leg lateral  views are presumably related to osteophyte. Degenerative changes of  right knee.    Vascular calcifications. Plantar calcaneal enthesophyte.    Left: Degenerative changes of the left hip. Mild contour irregularity  of the medial aspect of the femoral head neck junction on frog leg  lateral view are presumably related to osteophyte. Degenerative  changes of left knee.    Bony proliferation of medial malleolus, in keeping with remote trauma.  Vascular calcifications.      Impression    IMPRESSION:  1. No definite evidence of acute osseous abnormality in either femur,  tibia, or fibula.  2. Mild contour irregularity of the medial aspect of the femoral head  neck junction on frog leg lateral views are presumably related to  osteophyte. However, if high clinical suspicion of hip fracture,  consider MRI given underlying osteopenia.    Findings discussed with Dr. Beaver by Dr. Mcdonough.    STEPHANY Sierra

## 2017-03-06 NOTE — IP AVS SNAPSHOT
MRN:[id number]                      After Visit Summary   3/6/2017    Tasha Rob    MRN: [id number]           Thank you!     Thank you for choosing [facility] for your care. Our goal is always to provide you with excellent care. Hearing back from our patients is one way we can continue to improve our services. Please take a few minutes to complete the written survey that you may receive in the mail after you visit with us. Thank you!        Patient Information     Date Of Birth          1940        About your hospital stay     You were admitted on:  March 6, 2017 You last received care in the:  [address] [facility]    You were discharged on:  March 10, 2017        Reason for your hospital stay       You were hospitalized and treated for the following conditions:  Trauma mechanism:Presumed fall from standing   Time/date of injury: unknown; possibly Friday night   Known Injuries:  1. L4 compression fracture   2. Progression of L3 compression fracture since previous exam   Other diagnoses:   1. Acute pain   2. Alzheimer's Dementia  3. CVA  4. HLD  5. HTN  6. Chronic low back pain/sciatica  7. Generalized anxiety disorder  8. GERD   9. Vertigo  10. Chronic constipation     You were seen by the following services:  Trauma Service  Neurosurgery   Physical and Occupational therapies                  Who to Call     For medical emergencies, please call 911.  For non-urgent questions about your medical care, please call your primary care provider or clinic, [phone]          Attending Provider     Provider Specialty    Esther Mccabe MD Emergency Medicine    Stinson, Daniel Mistry MD Transplant       Primary Care Provider Office Phone # Fax #    Jose Alberto Muniz [phone]       [facility] [address]  [facility] [address]        After Care Instructions     Activity - Up with assistive device       Chair back LSO brace for comfort when out of bed,  ambulating.            Advance Diet as Tolerated       Follow this diet upon discharge: Orders Placed This Encounter      Snacks/Supplements Adult: Ensure Plus Adult Shake; Between Meals      Regular Diet Adult            Encourage PO fluids           Fall precautions           General info for SNF       Length of Stay Estimate: Short Term Care: Estimated # of Days <30  Condition at Discharge: Stable  Level of care:skilled   Rehabilitation Potential: Good  Admission H&P remains valid and up-to-date: Yes  Recent Chemotherapy: N/A  Use Nursing Home Standing Orders: Yes            Mantoux instructions       Give two-step Mantoux (PPD) Per Facility Policy Yes                  Follow-up Appointments     Follow Up and recommended labs and tests       1. Follow up with your primary care provider for continued medical care and hospital follow up in 5-10 days.     2. Trauma Clinic as needed.   [facility]  Floor 4  [address] [place] [address]   Appointments: [phone]     3. Neurosurgery Clinic in 3 months with upright lumbar XR.   Floor 3   [address] [place], [place] [address]   Appointments: [phone]                  Additional Services     Occupational Therapy Adult Consult       Evaluate and treat as clinically indicated.    Reason:  L3/L4 compression fractures            Physical Therapy Adult Consult       Evaluate and treat as clinically indicated.    Reason:  L3, L4 compression fractures, LSO brace for comfort                  Pending Results     No orders found from 3/4/2017 to 3/7/2017.            Statement of Approval     Ordered          03/10/17 1122  I have reviewed and agree with all the recommendations and orders detailed in this document.  EFFECTIVE NOW     Approved and electronically signed by:  Crystal Sierra APRN CNP           03/09/17 9539  I have reviewed and agree with all the recommendations and orders detailed in this document.  EFFECTIVE NOW    "  Approved and electronically signed by:  Nicole Steiner NP             Admission Information     Date & Time Provider Department Dept. Phone    3/6/2017 Daniel Stinson MD [address] [facility] [place] [phone]      Your Vitals Were     Blood Pressure Pulse Temperature Respirations Height Weight    142/69 (BP Location: Right arm) 87 97.9  F (36.6  C) (Axillary) 16 1.702 m (5' 7\") 69.8 kg (153 lb 14.1 oz)    Last Period Pulse Oximetry BMI (Body Mass Index)             (LMP Unknown) 96% 24.1 kg/m2         [email or web address] Information     [employer] lets you send messages to your doctor, view your test results, renew your prescriptions, schedule appointments and more. To sign up, go to [email or web address] . Click on \"Log in\" on the left side of the screen, which will take you to the Welcome page. Then click on \"Sign up Now\" on the right side of the page.     You will be asked to enter the access code listed below, as well as some personal information. Please follow the directions to create your username and password.     Your access code is: [email or web address]  Expires: 2017  9:23 AM     Your access code will  in 90 days. If you need help or a new code, please call your [place] clinic or [phone].        Care EveryWhere ID     This is your Care EveryWhere ID. This could be used by other organizations to access your [place] medical records  [id number]           Review of your medicines      START taking        Dose / Directions    bisacodyl 10 MG Suppository   Commonly known as:  DULCOLAX   Used for:  Drug-induced constipation   Replaces:  bisacodyl 5 MG EC tablet        Dose:  10 mg   Place 1 suppository (10 mg) rectally daily   Quantity:  30 suppository   Refills:  0       calcitonin (salmon) 200 UNIT/ACT nasal spray   Commonly known as:  MIACALCIN   Used for:  Compression fx, lumbar spine, closed, initial encounter (H)        Dose:  1 spray   Start taking on:  3/11/2017   Spray 1 spray into one nostril " alternating nostrils daily Alternate nostril each day.   Refills:  0       cholecalciferol 2000 UNITS tablet   Used for:  Vitamin D deficiency        Dose:  2000 Units   Take 2,000 Units by mouth daily   Quantity:  60 tablet   Refills:  0       gabapentin 300 MG capsule   Commonly known as:  NEURONTIN   Used for:  Sciatica of left side        Dose:  300 mg   Take 1 capsule (300 mg) by mouth At Bedtime   Quantity:  60 capsule   Refills:  0       heparin sodium PF 5000 UNIT/0.5ML injection   Used for:  Compression fx, lumbar spine, closed, initial encounter (H)        Dose:  5000 Units   Inject 0.5 mLs (5,000 Units) Subcutaneous every 12 hours   Quantity:  14 Syringe   Refills:  0       lidocaine 5 % Patch   Commonly known as:  LIDODERM   Used for:  Compression fx, lumbar spine, closed, initial encounter (H)        Dose:  2 patch   Place 2 patches onto the skin every 24 hours Apply to thoracic spine or Left hip   Quantity:  30 patch   Refills:  0       oxyCODONE 5 MG IR tablet   Commonly known as:  ROXICODONE   Used for:  Compression fx, lumbar spine, closed, initial encounter (H)        Dose:  5-10 mg   Take 1-2 tablets (5-10 mg) by mouth every 4 hours as needed for moderate to severe pain   Quantity:  60 tablet   Refills:  0         CONTINUE these medicines which may have CHANGED, or have new prescriptions. If we are uncertain of the size of tablets/capsules you have at home, strength may be listed as something that might have changed.        Dose / Directions    acetaminophen 325 MG tablet   Commonly known as:  TYLENOL   This may have changed:    - medication strength  - how much to take  - when to take this  - Another medication with the same name was removed. Continue taking this medication, and follow the directions you see here.   Used for:  Compression fx, lumbar spine, closed, initial encounter (H)        Dose:  975 mg   Take 3 tablets (975 mg) by mouth every 8 hours   Quantity:  100 tablet   Refills:  0        clonazePAM 0.5 MG tablet   Commonly known as:  klonoPIN   This may have changed:    - how much to take  - when to take this   Used for:  Late onset Alzheimer's disease with behavioral disturbance        Dose:  0.5 mg   Take 1 tablet (0.5 mg) by mouth 3 times daily as needed for anxiety   Quantity:  60 tablet   Refills:  0         CONTINUE these medicines which have NOT CHANGED        Dose / Directions    albuterol 108 (90 BASE) MCG/ACT Inhaler   Commonly known as:  albuterol   Used for:  Mild persistent asthma, uncomplicated        Dose:  2 puff   Inhale 2 puffs into the lungs every 4 hours as needed for shortness of breath / dyspnea or wheezing   Quantity:  1 Inhaler   Refills:  11       alum & mag hydroxide-simethicone 400-400-40 MG/5ML Susp suspension   Commonly known as:  MYLANTA ES/MAALOX  ES   Used for:  Gastroesophageal reflux disease without esophagitis        Dose:  30 mL   Take 30 mLs by mouth every 4 hours as needed for indigestion or heartburn   Quantity:  769 mL   Refills:  11       aspirin 162 MG EC tablet   Used for:  Cerebrovascular disease        Dose:  162 mg   Take 1 tablet (162 mg) by mouth daily   Quantity:  90 tablet   Refills:  0       meclizine 25 MG tablet   Commonly known as:  ANTIVERT   Used for:  Vertigo        Dose:  25 mg   Take 1 tablet (25 mg) by mouth 3 times daily as needed   Quantity:  90 tablet   Refills:  3       memantine 10 MG tablet   Commonly known as:  NAMENDA   Used for:  Late onset Alzheimer's disease with behavioral disturbance        Dose:  10 mg   Take 1 tablet (10 mg) by mouth 2 times daily   Quantity:  180 tablet   Refills:  3       montelukast 10 MG tablet   Commonly known as:  SINGULAIR   Used for:  Mild persistent asthma, uncomplicated        Dose:  1 tablet   Take 1 tablet (10 mg) by mouth daily   Quantity:  90 tablet   Refills:  3       omeprazole 20 MG CR capsule   Commonly known as:  priLOSEC   Used for:  Gastroesophageal reflux disease without esophagitis         Dose:  20 mg   Take 1 capsule (20 mg) by mouth daily   Quantity:  90 capsule   Refills:  3       ondansetron 4 MG ODT tab   Commonly known as:  ZOFRAN ODT   Used for:  Vertigo        Dose:  4-8 mg   Take 1-2 tablets (4-8 mg) by mouth every 8 hours as needed for nausea or vomiting   Quantity:  30 tablet   Refills:  0       polyethylene glycol powder   Commonly known as:  MIRALAX   Indication:  Constipation   Used for:  Slow transit constipation        Dose:  17 g   Take 17 g by mouth daily   Quantity:  500 g   Refills:  11       senna-docusate 8.6-50 MG per tablet   Commonly known as:  DELLA-COLACE   Used for:  Slow transit constipation        Dose:  2 tablet   Take 2 tablets by mouth 2 times daily   Quantity:  180 tablet   Refills:  3       STATIN NOT PRESCRIBED (INTENTIONAL)   Used for:  Hypercholesterolemia        Dose:  1 each   1 each daily Statin not prescribed intentionally due to Intolerance (with supporting documentation of trying a statin at least once within the last 5 years)   Quantity:  0 each   Refills:  0         STOP taking     bisacodyl 5 MG EC tablet   Commonly known as:  DULCOLAX   Replaced by:  bisacodyl 10 MG Suppository           CALCIUM 600 PO                Where to get your medicines      Some of these will need a paper prescription and others can be bought over the counter. Ask your nurse if you have questions.     You don't need a prescription for these medications     acetaminophen 325 MG tablet    albuterol 108 (90 BASE) MCG/ACT Inhaler    alum & mag hydroxide-simethicone 400-400-40 MG/5ML Susp suspension    aspirin 162 MG EC tablet    bisacodyl 10 MG Suppository    calcitonin (salmon) 200 UNIT/ACT nasal spray    cholecalciferol 2000 UNITS tablet    gabapentin 300 MG capsule    heparin sodium PF 5000 UNIT/0.5ML injection    lidocaine 5 % Patch    meclizine 25 MG tablet    memantine 10 MG tablet    montelukast 10 MG tablet    omeprazole 20 MG CR capsule    ondansetron 4 MG ODT tab     polyethylene glycol powder    senna-docusate 8.6-50 MG per tablet         Information about where to get these medications is not yet available     ! Ask your nurse or doctor about these medications     clonazePAM 0.5 MG tablet    oxyCODONE 5 MG IR tablet                Protect others around you: Learn how to safely use, store and throw away your medicines at www.disposemymeds.org.             Medication List: This is a list of all your medications and when to take them. Check marks below indicate your daily home schedule. Keep this list as a reference.      Medications           Morning Afternoon Evening Bedtime As Needed    acetaminophen 325 MG tablet   Commonly known as:  TYLENOL   Take 3 tablets (975 mg) by mouth every 8 hours   Last time this was given:  975 mg on 3/10/2017  9:02 AM                                albuterol 108 (90 BASE) MCG/ACT Inhaler   Commonly known as:  albuterol   Inhale 2 puffs into the lungs every 4 hours as needed for shortness of breath / dyspnea or wheezing                                alum & mag hydroxide-simethicone 400-400-40 MG/5ML Susp suspension   Commonly known as:  MYLANTA ES/MAALOX  ES   Take 30 mLs by mouth every 4 hours as needed for indigestion or heartburn                                aspirin 162 MG EC tablet   Take 1 tablet (162 mg) by mouth daily                                bisacodyl 10 MG Suppository   Commonly known as:  DULCOLAX   Place 1 suppository (10 mg) rectally daily   Last time this was given:  10 mg on 3/10/2017  9:01 AM                                calcitonin (salmon) 200 UNIT/ACT nasal spray   Commonly known as:  MIACALCIN   Spray 1 spray into one nostril alternating nostrils daily Alternate nostril each day.   Start taking on:  3/11/2017                                cholecalciferol 2000 UNITS tablet   Take 2,000 Units by mouth daily   Last time this was given:  2,000 Units on 3/10/2017  9:02 AM                                clonazePAM 0.5 MG  tablet   Commonly known as:  klonoPIN   Take 1 tablet (0.5 mg) by mouth 3 times daily as needed for anxiety   Last time this was given:  0.5 mg on 3/10/2017 12:50 PM                                gabapentin 300 MG capsule   Commonly known as:  NEURONTIN   Take 1 capsule (300 mg) by mouth At Bedtime   Last time this was given:  300 mg on 3/9/2017  8:34 PM                                heparin sodium PF 5000 UNIT/0.5ML injection   Inject 0.5 mLs (5,000 Units) Subcutaneous every 12 hours   Last time this was given:  5,000 Units on 3/10/2017  9:03 AM                                lidocaine 5 % Patch   Commonly known as:  LIDODERM   Place 2 patches onto the skin every 24 hours Apply to thoracic spine or Left hip   Last time this was given:  2 patches on 3/9/2017  8:35 PM                                meclizine 25 MG tablet   Commonly known as:  ANTIVERT   Take 1 tablet (25 mg) by mouth 3 times daily as needed                                memantine 10 MG tablet   Commonly known as:  NAMENDA   Take 1 tablet (10 mg) by mouth 2 times daily   Last time this was given:  10 mg on 3/10/2017  9:02 AM                                montelukast 10 MG tablet   Commonly known as:  SINGULAIR   Take 1 tablet (10 mg) by mouth daily   Last time this was given:  10 mg on 3/10/2017  9:03 AM                                omeprazole 20 MG CR capsule   Commonly known as:  priLOSEC   Take 1 capsule (20 mg) by mouth daily   Last time this was given:  20 mg on 3/10/2017  9:02 AM                                ondansetron 4 MG ODT tab   Commonly known as:  ZOFRAN ODT   Take 1-2 tablets (4-8 mg) by mouth every 8 hours as needed for nausea or vomiting                                oxyCODONE 5 MG IR tablet   Commonly known as:  ROXICODONE   Take 1-2 tablets (5-10 mg) by mouth every 4 hours as needed for moderate to severe pain   Last time this was given:  5 mg on 3/10/2017  2:07 PM                                polyethylene glycol powder    Commonly known as:  MIRALAX   Take 17 g by mouth daily                                senna-docusate 8.6-50 MG per tablet   Commonly known as:  DELLA-COLACE   Take 2 tablets by mouth 2 times daily   Last time this was given:  2 tablets on 3/10/2017  9:01 AM                                STATIN NOT PRESCRIBED (INTENTIONAL)   1 each daily Statin not prescribed intentionally due to Intolerance (with supporting documentation of trying a statin at least once within the last 5 years)

## 2017-03-06 NOTE — LETTER
Transition Communication Hand-off for Care Transitions to Next Level of Care Provider    Name: Tasha Rob  MRN #: [id number]  Primary Care Provider: Jose Alberto Muniz     Primary Clinic: [facility] [address]  [facility] [address]     Reason for Hospitalization:  Fall, initial encounter [W19.XXXA]  Compression fx, lumbar spine, closed, initial encounter (H) [S32.000A]    Admit Date/Time: 3/6/2017 11:06 AM  Discharge Date: 3/10/2017      Discharge Needs Assessment:  Needs       Most Recent Value    Equipment Currently Used at Home walker, rolling, grab bar, shower chair            Referrals     Future Labs/Procedures    Occupational Therapy Adult Consult     Comments:    Evaluate and treat as clinically indicated.    Reason:  L3/L4 compression fractures    Physical Therapy Adult Consult     Comments:    Evaluate and treat as clinically indicated.    Reason:  L3, L4 compression fractures, LSO brace for comfort            Key Recommendations:  Please review AVS    Omaira YU RN PHN  Patient Care Mgmt Coordinator [facility]  Pager: [phone]      AVS/Discharge Summary is the source of truth; this is a helpful guide for improved communication of patient story

## 2017-03-06 NOTE — ED NOTES
Pt going to xray and CT with ER tech and pts family member due to pts dementia and disorientation.

## 2017-03-06 NOTE — IP AVS SNAPSHOT
"` `     [address] [facility]: [phone]                                              INTERAGENCY TRANSFER FORM - NURSING   3/6/2017                    Hospital Admission Date: 3/6/2017  DENNIS GÓMEZ   : 1940  Sex: Female        Attending Provider: Daniel Stinson MD     Allergies:  Codeine Sulfate, Darvon [Propoxyphene Hcl], Declomycin [Demeclocycline Hcl], Fosamax, Iodine, Ivp Dye [Contrast Dye], Premarin [Conjugated Estrogens], Seafood, Sulfa Drugs    Infection:  None   Service:  TRAUMA    Ht:  1.702 m (5' 7\")   Wt:  69.8 kg (153 lb 14.1 oz)   Admission Wt:  69.8 kg (153 lb 14.1 oz)    BMI:  24.1 kg/m 2   BSA:  1.82 m 2            Patient PCP Information     Provider PCP Type    Jose Alberto Muniz MD General      Current Code Status     Date Active Code Status Order ID Comments User Context       3/6/2017  5:07 PM DNR/DNI [id number]  Crystal Sierra APRN CNP Inpatient       Code Status History     Date Active Date Inactive Code Status Order ID Comments User Context    10/14/2016 12:05 PM 3/6/2017  5:07 PM DNR/DNI [id number]  Christina Thomas PA-C Outpatient    2011  1:16 PM 10/14/2016 12:05 PM Full Code [id number]  Nikos Swartz MD Outpatient    12/3/2011  8:22 PM 2011  1:16 PM DNR/DNI [id number]  Dianne Win, FRITZ Inpatient      Advance Directives        Does patient have a scanned Advance Directive/ACP document in EPIC?           Yes        Hospital Problems as of 3/9/2017              Priority Class Noted POA    Compression fracture of L4 lumbar vertebra (H) Medium  3/6/2017 Yes      Non-Hospital Problems as of 3/9/2017              Priority Class Noted    Sciatica   2012    Generalized anxiety disorder   2012    Dizziness and giddiness   2012    Headache   2012    Cerebrovascular disease   2012    Memory loss   2012    Urge incontinence   2012    Degeneration of lumbar or lumbosacral intervertebral disc   2012    Mild persistent asthma   " 4/13/2013    Gastroesophageal reflux disease without esophagitis   7/17/2013    Vertigo   8/17/2013    Hyperlipidemia LDL goal <100 Medium  9/1/2013    Hypercholesterolemia Medium  9/1/2013    Late onset Alzheimer's disease with behavioral disturbance   9/13/2013    Hypertension goal BP (blood pressure) < 140/80 Medium  11/15/2013    ACP (advance care planning) Medium  11/18/2014    Acute bilateral low back pain without sciatica Medium  6/2/2016    Shoulder pain Medium  8/12/2016    Lumbar pain Medium  8/12/2016    BPPV (benign paroxysmal positional vertigo), bilateral Medium  8/26/2016      Immunizations     Name Date      Influenza (H1N1) 12/17/09     Influenza (High Dose) 3 valent vaccine 09/27/16     Influenza (High Dose) 3 valent vaccine 10/07/15     Influenza (High Dose) 3 valent vaccine 09/16/13     Influenza (High Dose) 3 valent vaccine 10/01/12     Influenza (IIV3) 10/11/10     Influenza (IIV3) 09/22/09     Influenza (IIV3) 10/21/08     Influenza (IIV3) 01/01/07     Influenza Vaccine IM 3yrs+ 4 Valent IIV4 10/07/14     Pneumococcal (PCV 13) 07/27/16     Pneumococcal 23 valent 08/21/13     TD (ADULT, 7+) 09/16/09     Tdap (Adacel,Boostrix) 01/06/99          END      ASSESSMENT     Discharge Profile Flowsheet     DISCHARGE NEEDS ASSESSMENT     Inspection  Full 03/09/17 0849    Equipment Currently Used at Home  walker, rolling;grab bar;shower chair 03/08/17 0912   Skin areas NOT inspected  Buttock, left;Buttock, right 03/08/17 0250    Transportation Available  car;family or friend will provide 08/18/13 0952   Skin WDL  ex 03/09/17 0849    Equipment Used at Home  cane, straight 08/17/13 0951   Skin Color/Characteristics  pale;redness blanchable 03/09/17 0500    GASTROINTESTINAL (ADULT,PEDIATRIC,OB)     Skin Temperature  warm 03/09/17 0849    GI WDL  WDL 03/09/17 0849   Skin Moisture  dry 03/09/17 0849    Last Bowel Movement  08/17/13 08/18/13 0515   Skin Integrity  intact 03/09/17 0849    COMMUNICATION  "ASSESSMENT     SAFETY      Patient's communication style  spoken language (English or Bilingual) 03/06/17 1105   Safety WDL  ex 03/09/17 0849    SKIN     Safety Factors  bed in low position;ID band on;upper side rails raised x 2;call light in reach;wheels locked 03/09/17 0849                 Assessment WDL (Within Defined Limits) Definitions           Safety WDL     Effective: 09/28/15    Row Information: <b>WDL Definition:</b> Bed in low position, wheels locked; call light in reach; upper side rails up x 2; ID band on<br> <font color=\"gray\"><i>Item=AS safety wdl>>List=AS safety wdl>>Version=F14</i></font>      Skin WDL     Effective: 09/28/15    Row Information: <b>WDL Definition:</b> Warm; dry; intact; elastic; without discoloration; pressure points without redness<br> <font color=\"gray\"><i>Item=AS skin wdl>>List=AS skin wdl>>Version=F14</i></font>      Vitals     Vital Signs Flowsheet     VITAL SIGNS     Comfort  comfortably manageable 03/08/17 0305    Temp  96.1  F (35.6  C) 03/09/17 0815   Change in Pain  about the same 03/07/17 1237    Temp src  Oral 03/09/17 0815   Pain Control  partially effective 03/08/17 0305    Resp  16 03/09/17 0815   Functioning  can do most things, but pain gets in the way of some 03/08/17 0305    Pulse  87 03/08/17 0830   Sleep  normal sleep 03/08/17 0305    Heart Rate  75 03/09/17 0815   HEIGHT AND WEIGHT      Pulse/Heart Rate Source  Monitor 03/09/17 0815   Height  1.702 m (5' 7\") 03/06/17 1639    BP  126/77 03/09/17 0815   Weight  69.8 kg (153 lb 14.1 oz) 03/06/17 1639    BP Location  Left arm 03/09/17 0815   BSA (Calculated - sq m)  1.82 03/06/17 1639    OXYGEN THERAPY     BMI (Calculated)  24.15 03/06/17 1639    SpO2  92 % 03/09/17 0815   ZAC COMA SCALE      O2 Device  None (Room air) 03/09/17 0815   Best Eye Response  4-->(E4) spontaneous 03/09/17 0849    PAIN/COMFORT     Best Motor Response  5-->(M5) localizes pain 03/09/17 0849    Patient Currently in Pain  yes 03/08/17 " 1706   Best Verbal Response  4-->(V4) confused 03/09/17 0849    Preferred Pain Scale  CAPA (Clinically Aligned Pain Assessment) ([facility] Adults Only) 03/08/17 1706   [place] Coma Scale Score  13 03/09/17 0849    Pain Location  Back 03/08/17 1706   POSITIONING      Pain Orientation  Right 03/08/17 1706   Body Position  supine, head elevated 03/09/17 0849    Pain Descriptors  Other (comment) (right side of body) 03/07/17 1042   Head of Bed (HOB)  HOB at 20-30 degrees 03/09/17 0849    Pain Intervention(s)  Medication (See eMAR) 03/08/17 1706   DAILY CARE      Response to Interventions  Absence of nonverbal indicators of pain (pt resting) 03/09/17 0533   Activity Type  activity adjusted per tolerance 03/09/17 0849    Additional Documentation  CAPA (Group) 03/08/17 0305   Activity Level of Assistance  assistance, 2 people 03/09/17 0849    CLINICALLY ALIGNED PAIN ASSESSMENT (CAPA) ([facility] ADULTS ONLY)                   Patient Lines/Drains/Airways Status    Active LINES/DRAINS/AIRWAYS     None            Patient Lines/Drains/Airways Status    Active PICC/CVC     None            Intake/Output Detail Report     Date Intake   Output Net    Shift P.O. IV Piggyback Total Urine Total       Day 03/08/17 0000 - 03/08/17 0659 -- -- -- -- -- 0    Kyra 03/08/17 0700 - 03/08/17 1459 365 -- 365 225 225 140    Noc 03/08/17 1500 - 03/08/17 2359 480 -- 480 300 300 180    Day 03/09/17 0000 - 03/09/17 0659 -- -- -- -- -- 0    Kyra 03/09/17 0700 - 03/09/17 1459 -- -- -- -- -- 0      Last Void/BM       Most Recent Value    Urine Occurrence 1 at 03/09/2017 0436    Stool Occurrence       Case Management/Discharge Planning     Case Management/Discharge Planning Flowsheet     REFERRAL INFORMATION     Equipment Used at Home  cane, straight 08/17/13 0951    Arrived From  long-term care 03/06/17 1654   FINAL RESOURCES      LIVING ENVIRONMENT     Equipment Currently Used at Home  walker, rolling;grab bar;shower  chair 03/08/17 0912    Lives With  facility resident 03/08/17 0912   MH/BH CAREGIVER      Living Arrangements  extended care facility 03/08/17 0912   Filed Complexity Screen Score  7 03/07/17 0826    COPING/STRESS     ABUSE RISK SCREEN      Major Change/Loss/Stressor  hospitalization;illness 03/06/17 1705   QUESTION TO PATIENT:  Has a member of your family or a partner(now or in the past) intimidated, hurt, manipulated, or controlled you in any way?  patient declined to answer or is unable to answer 03/06/17 1115    DISCHARGE PLANNING     QUESTION TO PATIENT: Do you feel safe going back to the place where you are living?  patient declined to answer or is unable to answer 03/06/17 1115    Transportation Available  car;family or friend will provide 08/18/13 0952   (R) MENTAL HEALTH SUICIDE RISK      Outpatient/Agency/Support Group Needs  homecare agency (specify level of care) 08/18/13 0952   Are you depressed or being treated for depression?  No (pt confused) 03/06/17 2046    Community Agency Name(S)  [facility] 08/18/13 0952

## 2017-03-06 NOTE — ED NOTES
Bed: ED12  Expected date: 3/6/17  Expected time: 10:58 AM  Means of arrival: Ambulance  Comments:  [id number]  76 female  Low back pain after a fall  Yellow

## 2017-03-06 NOTE — LETTER
Transition Communication Hand-off for Care Transitions to Next Level of Care Provider    Name: Tasha Rob  MRN #: [id number]  Primary Care Provider: Jose Alberto Muniz     Primary Clinic: [facility] [place] [facility] [address]  [place] [address]     Reason for Hospitalization:  Fall, initial encounter [W19.XXXA]  Compression fx, lumbar spine, closed, initial encounter (H) [S32.000A]  Admit Date/Time: 3/6/2017 11:06 AM  Discharge Date: Friday 3/10/17  Payor Source: Payor: MEDICARE / Plan: MEDICARE / Product Type: Medicare /     Readmission Assessment Measure (YAZMIN) Risk Score/category: NA    Plan of Care Goals/Milestone Events:   Patient Concerns: pt with advanced dementia, cries out, combative, grabs hold of hands, stethoscopes, etc.   Patient Goals:   Short-term to try to rehabilitate at [facility] with PT/OT.   Long-term will likely need long-term care and hospice, if appropriate   Medical Goals   Short-term as above.   Long-term as above.  Team in agreement with pt's Thalia VALLE.         Reason for Communication Hand-off Referral: Fragility  Multiple providers/specialties    Discharge Plan:       Concern for non-adherence with plan of care:   Y/N Yes--due to advanced dementia.  May not be able to participate or progress in therapies due to her dementia.  LEONARD Vásquez, supportive and involved.    Discharge Needs Assessment:  Needs       Most Recent Value    Equipment Currently Used at Home walker, rolling, grab bar, shower chair          Already enrolled in Tele-monitoring program and name of program:  uncertain  Follow-up specialty is recommended: Yes    Follow-up plan:  Future Appointments  Date Time Provider Department Center   3/11/2017 6:00 AM Laura Sandoval OT [facility]       Any outstanding tests or procedures:        Referrals     Future Labs/Procedures    Occupational Therapy Adult Consult     Comments:    Evaluate and treat as clinically indicated.    Reason:  L3/L4 compression  fractures    Physical Therapy Adult Consult     Comments:    Evaluate and treat as clinically indicated.    Reason:  L3, L4 compression fractures, LSO brace for comfort            Yin Recommendations:    Cam is discharging to the locked memory care unit at [facility] however we will order PT and OT to try to rehabilitate patient.  Pt was living in [facility] however Thalia, and team feel it is likely that pt will remain in long-term care.  Palliative Care team saw pt prior to discharge and determined that pt is not hospice appropriate at this time.  Thalia supports pt trying to rehabilitate however if this is not successful, would like to see pt continue with palliative care.  Thalia would benefit from ongoing supportive visits/calls as these moves for pt is overwhelming and pt's cognition is declining.    YIN AGUILAR  [phone]

## 2017-03-06 NOTE — ED NOTES
"Pt BIBA from dementia care facility in [facility] with reports of a fall at unknown date and time. Pt unable to report. NH reported to EMS lower back pain and R hip pain. Pt normally oriented to self on good days, but today pt is not oriented.     Pt walking with walker as of yesterday but much slower per family and today reports \"excruciating pain\" with minmal steps to bathroom at care facility.       "

## 2017-03-06 NOTE — IP AVS SNAPSHOT
"    [address] [facility]: [phone]                                              INTERAGENCY TRANSFER FORM - PHYSICIAN ORDERS   3/6/2017                    Hospital Admission Date: 3/6/2017  DENNIS GÓMEZ   : 1940  Sex: Female        Attending Provider: Daniel Stinson MD     Allergies:  Codeine Sulfate, Darvon [Propoxyphene Hcl], Declomycin [Demeclocycline Hcl], Fosamax, Iodine, Ivp Dye [Contrast Dye], Premarin [Conjugated Estrogens], Shellfish-derived Products, Sulfa Drugs    Infection:  None   Service:  TRAUMA    Ht:  1.702 m (5' 7\")   Wt:  69.8 kg (153 lb 14.1 oz)   Admission Wt:  69.8 kg (153 lb 14.1 oz)    BMI:  24.1 kg/m 2   BSA:  1.82 m 2            Patient PCP Information     Provider PCP Type    Jose Alberto Muniz MD General      ED Clinical Impression     Diagnosis Description Comment Added By Time Added    Fall, initial encounter [W19.XXXA] Fall, initial encounter [W19.XXXA]  Esther Mccabe MD 3/6/2017  2:34 PM    Compression fx, lumbar spine, closed, initial encounter (H) [S32.000A] Compression fx, lumbar spine, closed, initial encounter (H) [S32.000A]  Esther Mccabe MD 3/6/2017  2:35 PM      Hospital Problems as of 3/10/2017              Priority Class Noted POA    Compression fracture of L4 lumbar vertebra (H) Medium  3/6/2017 Yes      Non-Hospital Problems as of 3/10/2017              Priority Class Noted    Sciatica   2012    Generalized anxiety disorder   2012    Dizziness and giddiness   2012    Headache   2012    Cerebrovascular disease   2012    Memory loss   2012    Urge incontinence   2012    Degeneration of lumbar or lumbosacral intervertebral disc   2012    Mild persistent asthma   2013    Gastroesophageal reflux disease without esophagitis   2013    Vertigo   2013    Hyperlipidemia LDL goal <100 Medium  2013    Hypercholesterolemia Medium  2013    Late onset Alzheimer's disease with behavioral " disturbance   9/13/2013    Hypertension goal BP (blood pressure) < 140/80 Medium  11/15/2013    ACP (advance care planning) Medium  11/18/2014    Acute bilateral low back pain without sciatica Medium  6/2/2016    Shoulder pain Medium  8/12/2016    Lumbar pain Medium  8/12/2016    BPPV (benign paroxysmal positional vertigo), bilateral Medium  8/26/2016      Code Status History     Date Active Date Inactive Code Status Order ID Comments User Context    3/9/2017  1:45 PM  DNR/DNI [id number]  Nicole Steiner NP Outpatient    3/6/2017  5:07 PM 3/9/2017  1:45 PM DNR/DNI [id number]  Crystal Sierra, APRN CNP Inpatient    10/14/2016 12:05 PM 3/6/2017  5:07 PM DNR/DNI [id number]  Christina Thomas PA-C Outpatient    12/8/2011  1:16 PM 10/14/2016 12:05 PM Full Code [id number]  Nikos Swartz MD Outpatient    12/3/2011  8:22 PM 12/8/2011  1:16 PM DNR/DNI [id number]  Dianne Win RN Inpatient         Medication Review      START taking        Dose / Directions Comments    bisacodyl 10 MG Suppository   Commonly known as:  DULCOLAX   Used for:  Drug-induced constipation   Replaces:  bisacodyl 5 MG EC tablet        Dose:  10 mg   Place 1 suppository (10 mg) rectally daily   Quantity:  30 suppository   Refills:  0    Hold if having bowel movements, loose stools       calcitonin (salmon) 200 UNIT/ACT nasal spray   Commonly known as:  MIACALCIN   Used for:  Compression fx, lumbar spine, closed, initial encounter (H)        Dose:  1 spray   Start taking on:  3/11/2017   Spray 1 spray into one nostril alternating nostrils daily Alternate nostril each day.   Refills:  0        cholecalciferol 2000 UNITS tablet   Used for:  Vitamin D deficiency        Dose:  2000 Units   Take 2,000 Units by mouth daily   Quantity:  60 tablet   Refills:  0        gabapentin 300 MG capsule   Commonly known as:  NEURONTIN   Used for:  Sciatica of left side        Dose:  300 mg   Take 1 capsule (300 mg) by mouth At Bedtime   Quantity:  60 capsule    Refills:  0        heparin sodium PF 5000 UNIT/0.5ML injection   Used for:  Compression fx, lumbar spine, closed, initial encounter (H)        Dose:  5000 Units   Inject 0.5 mLs (5,000 Units) Subcutaneous every 12 hours   Quantity:  14 Syringe   Refills:  0    Continue for 1 week for DVT prophylaxis       lidocaine 5 % Patch   Commonly known as:  LIDODERM   Used for:  Compression fx, lumbar spine, closed, initial encounter (H)        Dose:  2 patch   Place 2 patches onto the skin every 24 hours Apply to thoracic spine or Left hip   Quantity:  30 patch   Refills:  0        oxyCODONE 5 MG IR tablet   Commonly known as:  ROXICODONE   Used for:  Compression fx, lumbar spine, closed, initial encounter (H)        Dose:  5-10 mg   Take 1-2 tablets (5-10 mg) by mouth every 4 hours as needed for moderate to severe pain   Quantity:  60 tablet   Refills:  0          CONTINUE these medications which may have CHANGED, or have new prescriptions. If we are uncertain of the size of tablets/capsules you have at home, strength may be listed as something that might have changed.        Dose / Directions Comments    acetaminophen 325 MG tablet   Commonly known as:  TYLENOL   This may have changed:    - medication strength  - how much to take  - when to take this  - Another medication with the same name was removed. Continue taking this medication, and follow the directions you see here.   Used for:  Compression fx, lumbar spine, closed, initial encounter (H)        Dose:  975 mg   Take 3 tablets (975 mg) by mouth every 8 hours   Quantity:  100 tablet   Refills:  0    Continue for 10 days, then take as needed       clonazePAM 0.5 MG tablet   Commonly known as:  klonoPIN   This may have changed:    - how much to take  - when to take this   Used for:  Late onset Alzheimer's disease with behavioral disturbance        Dose:  0.5 mg   Take 1 tablet (0.5 mg) by mouth 3 times daily as needed for anxiety   Quantity:  60 tablet   Refills:  0           CONTINUE these medications which have NOT CHANGED        Dose / Directions Comments    albuterol 108 (90 BASE) MCG/ACT Inhaler   Commonly known as:  albuterol   Used for:  Mild persistent asthma, uncomplicated        Dose:  2 puff   Inhale 2 puffs into the lungs every 4 hours as needed for shortness of breath / dyspnea or wheezing   Quantity:  1 Inhaler   Refills:  11        alum & mag hydroxide-simethicone 400-400-40 MG/5ML Susp suspension   Commonly known as:  MYLANTA ES/MAALOX  ES   Used for:  Gastroesophageal reflux disease without esophagitis        Dose:  30 mL   Take 30 mLs by mouth every 4 hours as needed for indigestion or heartburn   Quantity:  769 mL   Refills:  11        aspirin 162 MG EC tablet   Used for:  Cerebrovascular disease        Dose:  162 mg   Take 1 tablet (162 mg) by mouth daily   Quantity:  90 tablet   Refills:  0        meclizine 25 MG tablet   Commonly known as:  ANTIVERT   Used for:  Vertigo        Dose:  25 mg   Take 1 tablet (25 mg) by mouth 3 times daily as needed   Quantity:  90 tablet   Refills:  3        memantine 10 MG tablet   Commonly known as:  NAMENDA   Used for:  Late onset Alzheimer's disease with behavioral disturbance        Dose:  10 mg   Take 1 tablet (10 mg) by mouth 2 times daily   Quantity:  180 tablet   Refills:  3        montelukast 10 MG tablet   Commonly known as:  SINGULAIR   Used for:  Mild persistent asthma, uncomplicated        Dose:  1 tablet   Take 1 tablet (10 mg) by mouth daily   Quantity:  90 tablet   Refills:  3        omeprazole 20 MG CR capsule   Commonly known as:  priLOSEC   Used for:  Gastroesophageal reflux disease without esophagitis        Dose:  20 mg   Take 1 capsule (20 mg) by mouth daily   Quantity:  90 capsule   Refills:  3        ondansetron 4 MG ODT tab   Commonly known as:  ZOFRAN ODT   Used for:  Vertigo        Dose:  4-8 mg   Take 1-2 tablets (4-8 mg) by mouth every 8 hours as needed for nausea or vomiting   Quantity:  30  tablet   Refills:  0        polyethylene glycol powder   Commonly known as:  MIRALAX   Indication:  Constipation   Used for:  Slow transit constipation        Dose:  17 g   Take 17 g by mouth daily   Quantity:  500 g   Refills:  11        senna-docusate 8.6-50 MG per tablet   Commonly known as:  DELLA-COLACE   Used for:  Slow transit constipation        Dose:  2 tablet   Take 2 tablets by mouth 2 times daily   Quantity:  180 tablet   Refills:  3        STATIN NOT PRESCRIBED (INTENTIONAL)   Used for:  Hypercholesterolemia        Dose:  1 each   1 each daily Statin not prescribed intentionally due to Intolerance (with supporting documentation of trying a statin at least once within the last 5 years)   Quantity:  0 each   Refills:  0          STOP taking     bisacodyl 5 MG EC tablet   Commonly known as:  DULCOLAX   Replaced by:  bisacodyl 10 MG Suppository           CALCIUM 600 PO                   Summary of Visit     Reason for your hospital stay       You were hospitalized and treated for the following conditions:  Trauma mechanism:Presumed fall from standing   Time/date of injury: unknown; possibly Friday night   Known Injuries:  1. L4 compression fracture   2. Progression of L3 compression fracture since previous exam   Other diagnoses:   1. Acute pain   2. Alzheimer's Dementia  3. CVA  4. HLD  5. HTN  6. Chronic low back pain/sciatica  7. Generalized anxiety disorder  8. GERD   9. Vertigo  10. Chronic constipation     You were seen by the following services:  Trauma Service  Neurosurgery   Physical and Occupational therapies             After Care     Activity - Up with assistive device       Chair back LSO brace for comfort when out of bed, ambulating.       Advance Diet as Tolerated       Follow this diet upon discharge: Orders Placed This Encounter      Snacks/Supplements Adult: Ensure Plus Adult Shake; Between Meals      Regular Diet Adult       Encourage PO fluids           Fall precautions           General  info for SNF       Length of Stay Estimate: Short Term Care: Estimated # of Days <30  Condition at Discharge: Stable  Level of care:skilled   Rehabilitation Potential: Good  Admission H&P remains valid and up-to-date: Yes  Recent Chemotherapy: N/A  Use Nursing Home Standing Orders: Yes       Mantoux instructions       Give two-step Mantoux (PPD) Per Facility Policy Yes             Referrals     Occupational Therapy Adult Consult       Evaluate and treat as clinically indicated.    Reason:  L3/L4 compression fractures       Physical Therapy Adult Consult       Evaluate and treat as clinically indicated.    Reason:  L3, L4 compression fractures, LSO brace for comfort             Follow-Up Appointment Instructions     Future Labs/Procedures    Follow Up and recommended labs and tests     Comments:    1. Follow up with your primary care provider for continued medical care and hospital follow up in 5-10 days.     2. Trauma Clinic as needed.   [facility]  Floor 4  [address] [place] [address]   Appointments: [phone]     3. Neurosurgery Clinic in 3 months with upright lumbar XR.   Floor 3   [address] [place] [address]   Appointments: [phone]      Follow-Up Appointment Instructions     Follow Up and recommended labs and tests       1. Follow up with your primary care provider for continued medical care and hospital follow up in 5-10 days.     2. Trauma Clinic as needed.   [facility]  Floor 4  [address] [place] [address]   Appointments: [phone]     3. Neurosurgery Clinic in 3 months with upright lumbar XR.   Floor 3   [address] [place] [address]   Appointments: [phone]             Statement of Approval     Ordered          03/10/17 1122  I have reviewed and agree with all the recommendations and orders detailed in this document.  EFFECTIVE NOW     Approved and electronically signed by:  Crystal Sierra  FRITZ Caldera CNP           03/09/17 1419  I have reviewed and agree with all the recommendations and orders detailed in this document.  EFFECTIVE NOW     Approved and electronically signed by:  Nicole Steiner NP

## 2017-03-06 NOTE — IP AVS SNAPSHOT
[address] [address]    Phone:  [phone]                                       After Visit Summary   3/6/2017    Tasha Rob    MRN: [id number]           After Visit Summary Signature Page     I have received my discharge instructions, and my questions have been answered. I have discussed any challenges I see with this plan with the nurse or doctor.    ..........................................................................................................................................  Patient/Patient Representative Signature      ..........................................................................................................................................  Patient Representative Print Name and Relationship to Patient    ..................................................               ................................................  Date                                            Time    ..........................................................................................................................................  Reviewed by Signature/Title    ...................................................              ..............................................  Date                                                            Time

## 2017-03-06 NOTE — PHARMACY-ADMISSION MEDICATION HISTORY
Admission medication history interview status for the 3/6/2017 admission is complete. See Epic admission navigator for allergy information, pharmacy, prior to admission medications and immunization status.     Medication history interview sources:  med list and times from [facility] ([phone]), [facility]'s nurse    Changes made to PTA medication list (reason)  Added: calcium (per med list)    Deleted:   - calcium + vitamin D (per med list, patient takes calcium without vitamin D - calcium added)  - duplicate meclizine order  - multivitamin (not on med list)    Changed: none    Additional medication history information (including reliability of information, actions taken by pharmacist):  - Information verified with med list sent from [facility]. Per nurse at [facility], patient has taken all of her morning medications prior to transferring to the hospital.  - Patient had her flu shot in September 2016.      Prior to Admission medications    Medication Sig Last Dose Taking? Auth Provider   Acetaminophen (TYLENOL PO) Take 1,000 mg by mouth 3 times daily 3/6/2017 at AM Yes Unknown, Entered By History   Calcium Carbonate (CALCIUM 600 PO) Take 600 mg by mouth daily 3/6/2017 at AM Yes Unknown, Entered By History   Acetaminophen (TYLENOL PO) Take 500 mg by mouth every 4 hours as needed for mild pain or fever PRN at unknown Yes Unknown, Entered By History   albuterol (ALBUTEROL) 108 (90 BASE) MCG/ACT inhaler Inhale 2 puffs into the lungs every 4 hours as needed for shortness of breath / dyspnea or wheezing PRN at unknown Yes Christina Thomas PA-C   aspirin 162 MG EC tablet Take 1 tablet (162 mg) by mouth daily 3/6/2017 at AM Yes Christina Thomas PA-C   bisacodyl (DULCOLAX) 5 MG EC tablet Take 1 tablet (5 mg) by mouth daily as needed for constipation PRN at unknwon Yes Christina Thomas PA-C   memantine (NAMENDA) 10 MG tablet Take 1 tablet (10 mg) by mouth 2 times daily 3/6/2017 at AM  Yes Christina Thomas PA-C   montelukast (SINGULAIR) 10 MG tablet Take 1 tablet (10 mg) by mouth daily 3/6/2017 at AM Yes Christina Thomas PA-C   omeprazole (PRILOSEC) 20 MG capsule Take 1 capsule (20 mg) by mouth daily 3/6/2017 at AM Yes Christina Thomas PA-C   ondansetron (ZOFRAN ODT) 4 MG disintegrating tablet Take 1-2 tablets (4-8 mg) by mouth every 8 hours as needed for nausea or vomiting PRN at unknown Yes Christina Thomas PA-C   senna-docusate (DELLA-COLACE) 8.6-50 MG per tablet Take 2 tablets by mouth 2 times daily 3/6/2017 at AM Yes Christina Thomas PA-C   polyethylene glycol (MIRALAX) powder Take 17 g by mouth daily 3/6/2017 at AM Yes Christina Thomas PA-C   alum & mag hydroxide-simethicone (MYLANTA ES/MAALOX  ES) 400-400-40 MG/5ML SUSP Take 30 mLs by mouth every 4 hours as needed for indigestion or heartburn PRN at unknown Yes Christina Thomas PA-C   clonazePAM (KLONOPIN) 0.5 MG tablet Take 0.5 tablets (0.25 mg) by mouth 2 times daily as needed for anxiety PRN at unknown  Jose Alberto Muniz MD   meclizine (ANTIVERT) 25 MG tablet Take 1 tablet (25 mg) by mouth 3 times daily as needed PRN at unknown  Christina Thomas PA-C   STATIN NOT PRESCRIBED, INTENTIONAL, 1 each daily Statin not prescribed intentionally due to Intolerance (with supporting documentation of trying a statin at least once within the last 5 years)   Jose Alberto Muniz MD       Medication history completed by: Latonia Chan, PharmD  PGY-1 Pharmacy Resident

## 2017-03-06 NOTE — ED PROVIDER NOTES
History     Chief Complaint   Patient presents with     Fall     The history is provided by the patient and a friend. The history is limited by the condition of the patient (alzheimers).     Tasha Rob is a 76 year old female with history of vertigo, chronic lumbar pain, sciatica, degeneration of lumbar or lumbosacral intervertebral disc, and late onset Alzheimer's disease with behavioral disturbance who presents via ambulance from her memory care facility with acute on chronic lower back pain radiating into bilateral lower extremities to the point where she was unable to ambulate resulting in an unwitnessed fall a couple of days ago. Her pain does radiate down her bilateral lower extremities. Patient denies dysuria, but did have one episode of urinary incontinence this morning. Patient also complains of severe lower abdominal pain. 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 Patient's friend feels pain has returned to severity of before receiving steroid injection. Her friend also notes over the past three days the patient has seemed increasingly confused with increased paranoia. Patient's friend acts as her power of .     I have reviewed the Medications, Allergies, Past Medical and Surgical History, and Social History in the [employer] system.  Past Medical History   Diagnosis Date     Asthma      Gastro-oesophageal reflux disease      High cholesterol      Unspecified cerebral artery occlusion with cerebral infarction        Past Surgical History   Procedure Laterality Date     Cholecystectomy       Hysterectomy       Cholecystectomy  1987     Facial nerve surgery  1980's - Jaquez's neuroma R     Hysterectomy  1982     Hc tooth extraction w/forcep         Family History   Problem Relation Age of Onset     CEREBROVASCULAR DISEASE Mother      CANCER Father        Social History    Substance Use Topics     Smoking status: Never Smoker     Smokeless tobacco: Never Used     Alcohol use No     No current facility-administered medications for this encounter.      Current Outpatient Prescriptions   Medication     meclizine (ANTIVERT) 25 MG tablet     clonazePAM (KLONOPIN) 0.5 MG tablet     albuterol (ALBUTEROL) 108 (90 BASE) MCG/ACT inhaler     aspirin 162 MG EC tablet     bisacodyl (DULCOLAX) 5 MG EC tablet     calcium Citrate-vitamin D 500-400 MG-UNIT CHEW     meclizine (ANTIVERT) 25 MG tablet     memantine (NAMENDA) 10 MG tablet     montelukast (SINGULAIR) 10 MG tablet     multivitamin, therapeutic with minerals (MULTI-VITAMIN) TABS     omeprazole (PRILOSEC) 20 MG capsule     ondansetron (ZOFRAN ODT) 4 MG disintegrating tablet     senna-docusate (DELLA-COLACE) 8.6-50 MG per tablet     polyethylene glycol (MIRALAX) powder     alum & mag hydroxide-simethicone (MYLANTA ES/MAALOX  ES) 400-400-40 MG/5ML SUSP     STATIN NOT PRESCRIBED, INTENTIONAL,        Allergies   Allergen Reactions     Codeine Sulfate      Darvon [Propoxyphene Hcl]      Declomycin [Demeclocycline Hcl]      Fosamax GI Disturbance     Iodine      Ivp Dye [Contrast Dye]      Premarin [Conjugated Estrogens]      Seafood      Sulfa Drugs        Review of Systems   Genitourinary: Negative for dysuria.        Positive for one episode of incontinence   Musculoskeletal: Positive for arthralgias (radiating to BLE) and back pain (lower lumbar).   Psychiatric/Behavioral: Positive for confusion (alzheimers).   All other systems reviewed and are negative.      Physical Exam   BP: 117/67  Heart Rate: 90  Temp: 98.2  F (36.8  C)  SpO2: 100 %  Physical Exam   Constitutional: No distress.   HENT:   Head: Atraumatic.   Mouth/Throat: Oropharynx is clear and moist. No oropharyngeal exudate.   Eyes: Pupils are equal, round, and reactive to light. No scleral icterus.   Neck:   No midline tenderness   Cardiovascular: Normal heart sounds and intact  distal pulses.    Pulmonary/Chest: Breath sounds normal. No respiratory distress.   Abdominal: Soft. There is no tenderness.   Musculoskeletal: She exhibits tenderness. She exhibits no edema.        Legs:  Neurological: She is alert. She exhibits normal muscle tone.   Confused. No focal deficits noted.   Skin: Skin is warm. No rash noted. She is not diaphoretic.       ED Course     ED Course   11:58 AM  The patient was seen and examined by Dr. Mccabe in Room 12.       Procedures             Critical Care time:  none               Labs Ordered and Resulted from Time of ED Arrival Up to the Time of Departure from the ED   BASIC METABOLIC PANEL - Abnormal; Notable for the following:        Result Value    Glucose 105 (*)     All other components within normal limits   CBC WITH PLATELETS DIFFERENTIAL - Abnormal; Notable for the following:     Platelet Count 133 (*)     All other components within normal limits   ROUTINE UA WITH MICROSCOPIC - Abnormal; Notable for the following:     Glucose Urine 30 (*)     Leukocyte Esterase Urine Small (*)     Bacteria Urine Few (*)     Mucous Urine Present (*)     All other components within normal limits       Assessments & Plan (with Medical Decision Making)   The patient is not able to provide any useful history.  Did check some basic labs, including urinalysis, which are unremarkable.  She does seem uncomfortable with movement of her hips, but beyond that her exam is unremarkable.  I did receive a call from the radiologist stating that she has a likely new fracture at L4 superior endplate (compression), and progressed L3 superior endplate compression fracture.  This is new when compared with previous imaging from May 2016.  He also notes that both proximal femurs have some slight contour irregularity which could represent a nondisplaced fracture, although this is not clear.  I did speak with trauma, and in light of the likely fall, likely new compression fracture the back, and  difficulty moving around, they will admit her.  I do think she ll likely need an MRI to further assess the hips, but they agreed that this can be done after transfer to the floor given the very high census in the emergency department at this time. I did speak with the patient and her friend, who is her power of , and they re agreeable to admission.  Patient appears comfortable while lying in bed and has not been given any medications at this time.      I have reviewed the nursing notes.    I have reviewed the findings, diagnosis, plan and need for follow up with the patient.  This part of the document was transcribed by Estrella Leblanc, Medical Scribe.   New Prescriptions    No medications on file       Final diagnoses:   Fall, initial encounter   Compression fx, lumbar spine, closed, initial encounter (H)   I, Gregoria Sin, am serving as a trained medical scribe to document services personally performed by Esther Mccabe MD, based on the provider's statements to me.   Esther JIM MD, was physically present and have reviewed and verified the accuracy of this note documented by Gregoria Sin.      3/6/2017   [facility], [place], EMERGENCY DEPARTMENT     Esther Mccabe MD  03/06/17 0226

## 2017-03-06 NOTE — IP AVS SNAPSHOT
"    UNIT [address]B [facility]: [phone]                                              INTERAGENCY TRANSFER FORM - LAB / IMAGING / EKG / EMG RESULTS   3/6/2017                    Hospital Admission Date: 3/6/2017  DENNIS GÓMEZ   : 1940  Sex: Female        Attending Provider: Daniel Stinson MD     Allergies:  Codeine Sulfate, Darvon [Propoxyphene Hcl], Declomycin [Demeclocycline Hcl], Fosamax, Iodine, Ivp Dye [Contrast Dye], Premarin [Conjugated Estrogens], Shellfish-derived Products, Sulfa Drugs    Infection:  None   Service:  TRAUMA    Ht:  1.702 m (5' 7\")   Wt:  69.8 kg (153 lb 14.1 oz)   Admission Wt:  69.8 kg (153 lb 14.1 oz)    BMI:  24.1 kg/m 2   BSA:  1.82 m 2            Patient PCP Information     Provider PCP Type    Jose Alberto uMniz MD General         Lab Results - 3 Days      Vitamin D [[id number]]  Resulted: 17 1259, Result status: Final result    Ordering provider: Crystal Sierra APRN CNP  17 1106 Resulting lab: [facility]    Specimen Information    Type Source Collected On   Blood  17 1115          Components       Value Reference Range Flag Lab   Vitamin D Deficiency screening 27 20 - 75 ug/L  75   Comment:         Season, race, dietary intake, and treatment affect the concentration of   25-hydroxy-Vitamin D. Values may decrease during winter months and increase   during summer months. Values 20-29 ug/L may indicate Vitamin D insufficiency   and values <20 ug/L may indicate Vitamin D deficiency.   Vitamin D determination is routinely performed by an immunoassay specific for   25 hydroxyvitamin D3.  If an individual is on vitamin D2 (ergocalciferol)   supplementation, please specify 25 OH vitamin D2 and D3 level determination   by   LCMSMS test VITD23.              Platelet count [[id number]]  Resulted: 17 1230, Result status: Final result    Ordering provider: Crystal Sierra APRN CNP  17 0000 Resulting lab: [facility]    Specimen Information    Type Source Collected On   Blood  03/09/17 1145          Components       Value Reference Range Flag Lab   Platelet Count 168 150 - 450 10e9/L  51            Basic metabolic panel [[id number]]  Resulted: 03/07/17 0833, Result status: Final result    Ordering provider: Crystal Sierra APRN CNP  03/07/17 0100 Resulting lab: [facility]    Specimen Information    Type Source Collected On   Blood  03/07/17 0717          Components       Value Reference Range Flag Lab   Sodium 140 133 - 144 mmol/L  51   Potassium 3.8 3.4 - 5.3 mmol/L  51   Chloride 104 94 - 109 mmol/L  51   Carbon Dioxide 27 20 - 32 mmol/L  51   Anion Gap 9 3 - 14 mmol/L  51   Glucose 96 70 - 99 mg/dL  51   Urea Nitrogen 17 7 - 30 mg/dL  51   Creatinine 0.58 0.52 - 1.04 mg/dL  51   GFR Estimate -- >60 mL/min/1.7m2  51   Result:         >90  Non  GFR Calc     GFR Estimate If Black -- >60 mL/min/1.7m2  51   Result:         >90   GFR Calc     Calcium 9.2 8.5 - 10.1 mg/dL  51   Result:              Magnesium [[id number]] (Abnormal)  Resulted: 03/07/17 0833, Result status: Final result    Ordering provider: Crystal Sierra APRN CNP  03/07/17 0100 Resulting lab: [facility]    Specimen Information    Type Source Collected On   Blood  03/07/17 0717          Components       Value Reference Range Flag Lab   Magnesium 2.4 1.6 - 2.3 mg/dL H 51            Phosphorus [[id number]]  Resulted: 03/07/17 0833, Result status: Final result    Ordering provider: Crystal Sierra APRN CNP  03/07/17 0100 Resulting lab: [facility]    Specimen Information    Type Source Collected On   Blood  03/07/17 0717          Components       Value Reference Range Flag Lab   Phosphorus 2.9 2.5 - 4.5 mg/dL  51            CBC with platelets [[id number]] (Abnormal)  Resulted: 03/07/17 0808, Result  "status: Final result    Ordering provider: rCystal Sierra APRN CNP  03/07/17 0100 Resulting lab: [facility]    Specimen Information    Type Source Collected On   Blood  03/07/17 0717          Components       Value Reference Range Flag Lab   WBC 6.5 4.0 - 11.0 10e9/L  51   RBC Count 4.06 3.8 - 5.2 10e12/L  51   Hemoglobin 12.6 11.7 - 15.7 g/dL  51   Hematocrit 37.9 35.0 - 47.0 %  51   MCV 93 78 - 100 fl  51   MCH 31.0 26.5 - 33.0 pg  51   MCHC 33.2 31.5 - 36.5 g/dL  51   RDW 14.4 10.0 - 15.0 %  51   Platelet Count 135 150 - 450 10e9/L L 51            Testing Performed By     Lab - Abbreviation Name Director Address Valid Date Range    51 - Unknown [facility] Unknown [address] [facility] [id number] 12/31/14 1010 - Present    75 - Unknown [facility] Unknown [address] [facility] [id number] 01/15/15 1019 - Present            Unresulted Labs (24h ago through future)    Start       Ordered    03/09/17 0600  Platelet count  (Pharmacological Prophylaxis- heparin (If CrCl less than 30 mL/min)- UU,UR,UA,SH,RH,PH,WY)  EVERY THREE DAYS,   Routine     Comments:  Repeat every 3 days while on VTE prophylaxis. If no result is listed, this lab has not been done the past 365 days. LATEST LAB RESULT: Platelet Count (10e9/L)       Date                     Value                 03/06/2017               133 (L)          ----------      03/06/17 1644    Unscheduled  Potassium  (Potassium Replacement - \"Standard\" - For K levels less than 3.4 mmol/L - UU,UR,UA,RH,SH,PH,WY )  CONDITIONAL (SPECIFY),   Routine     Comments:  Obtain Potassium Level for these conditions:  *IF no potassium result within 24 hours before initiation of order set, draw potassium level with next lab collect.    *2 HOURS AFTER last IV potassium replacement dose and 4 hours after an oral replacement dose.  *Next morning after potassium dose.     Repeat " "Potassium Replacement if necessary.    03/06/17 1644    Unscheduled  Magnesium  (Magnesium Replacement -  Adult - \"Standard\" - Replacement for all levels less than 1.6 mg/dL )  CONDITIONAL (SPECIFY),   Routine     Comments:  Obtain Magnesium Level for these conditions:  *IF no magnesium result within 24 hrs before initiation of order set, draw magnesium level with next lab collect.    *2 HOURS AFTER last magnesium replacement dose when magnesium replacement given for level less than 1.6   *Next morning after magnesium dose.     Repeat Magnesium Replacement if necessary.    03/06/17 1644         Imaging Results - 3 Days      CT Lumbar Spine w/o Contrast [[id number]]  Resulted: 03/07/17 1110, Result status: Final result    Ordering provider: Deuce Hogue MD  03/06/17 1846 Resulted by: Jb Fuller MD Lee, Mark Theodore, DO    Performed: 03/06/17 1957 - 03/06/17 2011 Resulting lab: RADIOLOGY RESULTS    Narrative:       CT LUMBAR SPINE W/O CONTRAST 3/6/2017 8:11 PM    History: eval for compression fracture.    Comparison: Lumbar spine plain films 3/6/2017 and 5/31/2016.    Technique: Using multidetector thin collimation helical acquisition  technique, axial, coronal and sagittal CT images through the lumbar  spine were obtained without intravenous contrast.     Findings: There are 5 lumbar type vertebrae. Regarding alignment, the  lumbar spine alignment appears preserved. There is significant disc  narrowing at L4-5, L5-S1 with milder disc narrowing remaining lumbar  vertebral levels. There is significant anterior endplate osteophytic  spurring seen throughout the lumbar spine and superior endplate  Schmorl's nodes at L2-4. There is again seen anterior wedging  compression deformity at L4 which may is new, but age indeterminate  since the plain film dated 5/31/2016. The compression deformity along  the superior endplate of L3 is progressed compared to the 5/31/2016  study. Posterior endplate osteophytic " spurring is predominantly seen  at L3-5.. Findings on a level by level basis are as follows:    T12-L1: Anterior endplate osteophytic spurring. No significant spinal  canal stenosis or neural foraminal stenosis.    L1-L2: Anterior endplate osteophytic spurring. Mild spinal canal  stenosis. Bilateral facet hypertrophy with mild left neural foraminal  stenosis.    L2-L3:  Anterior and posterior endplate osteophytic spurring with  effacement of the ventral thecal sac and mild spinal canal stenosis.  Facet hypertrophy with mild left neural foraminal stenosis. L2  anterior endplate Schmorl's node.    L3-L4: Anterior and posterior endplate osteophytic spurring with  effacement of ventral thecal sac and cord resulting in moderate spinal  canal stenosis.. Bilateral facet hypertrophy with moderate left and  mild right neural foraminal stenosis. L3 anterior endplate Schmorl's  node.    L4-L5: Mild calcification of the fibrosis minimal effacement of the  ventral thecal sac causing minimal spinal canal stenosis. Bilateral  facet hypertrophy with mild to moderate right and mild left neural  foraminal stenosis.    L5-S1: Anterior and posterior endplate osteophytic spurring without  significant spinal canal stenosis. Facet hypertrophy with mild left  and moderate right neural foraminal stenosis..    The visualized adjacent paraspinous tissues are grossly within normal  limits. Atherosclerotic calcifications of the abdominal aorta without  aneurysmal dilatation.      Impression:       Impression:     1. New anterior wedging compression fracture of the L4 vertebral body  compared to 5/31/2016 which is age indeterminant. Evaluation for point  tenderness could be of value.  2. Progression of the superior endplate fracture of L3 compared to  5/31/2016.    I have personally reviewed the examination and initial interpretation  and I agree with the findings.    DORI DEAN MD      Testing Performed By     Lab - Abbreviation Name  Director Address Valid Date Range    104 - Rad [facility] RADIOLOGY RESULTS Unknown Unknown 02/16/05 1553 - Present            Encounter-Level Documents:     There are no encounter-level documents.      Order-Level Documents:     There are no order-level documents.

## 2017-03-07 NOTE — PROGRESS NOTES
S: Pt was seen today at [facility] for eval/fitting for a chairback LSO as ordered by Dr. Daneil    O/G: The objective/goal is to reduce pain and motion of the lumbar spine.    A: At this time I have fit pt with an [employer] Form  LSO.  The pt/nursing staff was instructed on donning/doffing; care and cleaning of the LSO was explained.  Patient confused during the fitting and at the end, requested that it be loosened.  Nursing staff left the LSO behind her but unfastened.  I did instruct nursing on use and care and left the instruction booklet bedside.      P: the pt was instructed to call if any problems or questions arise.

## 2017-03-07 NOTE — PROGRESS NOTES
Rounded on pt and friend. Pt confused, unable to do meaningful teaching. Gave handouts on compression fracture, osteoporosis and fall prevention to friend. Offered some tips to friend that the assisted living could do to minimize falls and injuries if she goes back to her current facility.

## 2017-03-07 NOTE — PROGRESS NOTES
03/07/17 1445   Quick Adds   Type of Visit Initial PT Evaluation   Living Environment   Lives With facility resident  (Nursing home)   Living Arrangements extended care facility   Living Environment Comment Pt not good historian, unable to obtain info about living situation, all known information from RN/chart review.   Self-Care   Activity/Exercise/Self-Care Comment Per chart review/RN pt living in nursing home, no notes found about level of IND, call facility to find out mroe about PLOF.   Functional Level Prior   Prior Functional Level Comment Per RN pt was receiving assist for all functional mobility however how much is not known.  Pt poor historian due to cognitive deficits however was able to state that she was previously using a walker.   General Information   Onset of Illness/Injury or Date of Surgery - Date 03/06/17   Referring Physician Crystal Sierra APRN CNP   Pertinent History of Current Problem (include personal factors and/or comorbidities that impact the POC) 76 year old female who with history of advanced Alzheimer's dementia, chronic low back pain with degenerative changes in lumbar spine, sciatica, GERD, HTN, and anxiety who presents to [facility] ED from assisted living/memory care on 3/6 with back and hip pain.  The patient is unable to provide a history given dementia.  History is obtained from patient's friend Thalia, who is also power of .  According to Thalia, the patient apparently told staff on Saturday morning that she had fallen on Friday night.  On Saturday the patient was complaining of increased hip pain. Of note, the patient has chronic left hip pain, and had an injection approximately a month ago which significantly reduced pain.  This morning, patient was experiencing increased back and hip pain, which was apparently severe and limiting ambulation and ability to toilet.   Patient is not able to accurately recall recent events and it is unclear if there were any recent  falls, but given increase in pain, patient was brought in by ambulance for evaluation   Precautions/Limitations fall precautions   Weight-Bearing Status - LUE full weight-bearing   Weight-Bearing Status - RUE full weight-bearing   Weight-Bearing Status - LLE full weight-bearing   Weight-Bearing Status - RLE full weight-bearing   General Observations Brace on when OOB with no other precautions per MD   General Info Comments Activity: up with assist   Cognitive Status Examination   Orientation person   Level of Consciousness confused;agitated   Follows Commands and Answers Questions 25% of the time   Personal Safety and Judgment impaired   Memory impaired   Cognitive Comment Bed alarm on for safety   Range of Motion (ROM)   ROM Comment B LE's WFL   Strength   Strength Comments Not able to formally test due to impaired cognition/confusion however LE strength functional enough as pt able to stand with assist and walker   Bed Mobility   Bed Mobility Comments MaxA x 2 for bed mobility due to pain and confusion   Transfer Skills   Transfer Comments Min-ModA x 2 with use of FWW for sit<>stand transfers   Gait   Gait Comments Unable to assess   Balance   Balance Comments Able to maintain seated balance with CGA-Jennifer with cues for UE use, unable to assess standing balance   General Therapy Interventions   Planned Therapy Interventions bed mobility training;transfer training;gait training;balance training;neuromuscular re-education;ROM;strengthening;risk factor education;home program guidelines;progressive activity/exercise   Clinical Impression   Criteria for Skilled Therapeutic Intervention yes, treatment indicated   PT Diagnosis Impaired functional mobility   Influenced by the following impairments Decreased strength, activity tolerance, and balance, increased pain   Functional limitations due to impairments Inability to perform functional mobility safely and IND   Clinical Presentation Stable/Uncomplicated   Clinical  "Presentation Rationale Clinical judgement   Clinical Decision Making (Complexity) Low complexity   Therapy Frequency` 5 times/week   Predicted Duration of Therapy Intervention (days/wks) 1 week   Anticipated Equipment Needs at Discharge (Determine with more info about baseline)   Anticipated Discharge Disposition Transitional Care Facility;Long Term Care Facility   Risk & Benefits of therapy have been explained Yes   Patient, Family & other staff in agreement with plan of care Yes   Clinical Impression Comments Pt would benefit from in-patient PT in order to increase strength and IND with functional mobility.   [facility] [employer] TM \"6 Clicks\"   2016, Trustees of [facility], under license to [employer].  All rights reserved.   6 Clicks Short Forms Basic Mobility Inpatient Short Form   [facility]  \"6 Clicks\" V.2 Basic Mobility Inpatient Short Form   1. Turning from your back to your side while in a flat bed without using bedrails? 2 - A Lot   2. Moving from lying on your back to sitting on the side of a flat bed without using bedrails? 2 - A Lot   3. Moving to and from a bed to a chair (including a wheelchair)? 2 - A Lot   4. Standing up from a chair using your arms (e.g., wheelchair, or bedside chair)? 2 - A Lot   5. To walk in hospital room? 2 - A Lot   6. Climbing 3-5 steps with a railing? 1 - Total   Basic Mobility Raw Score (Score out of 24.Lower scores equate to lower levels of function) 11   Total Evaluation Time   Total Evaluation Time (Minutes) 5     "

## 2017-03-07 NOTE — PLAN OF CARE
Problem: Goal Outcome Summary  Goal: Goal Outcome Summary  PT: PT evaluation completed and treatment initiated.  Per MD pt to have brace on when OOB with no other spinal restrictions.  Pt confused and poor historian so unable to obtain history about living situation or prior level of function. Pt required maxA x 2 for bed mobility and modA x 2 with use of FWW for sit<>stand transfers.  Pt able to stand for 30 seconds before sitting due to pain, unable to assess gait at this time.  Pt able to sit EOB for 10 minutes through out session with Jennifer-CGA.  Recommend return to nursing home if appropriate level of assist can be given.

## 2017-03-07 NOTE — PLAN OF CARE
"Problem: Goal Outcome Summary  Goal: Goal Outcome Summary  Vitals:     03/06/17 1445 03/06/17 1450 03/06/17 1451 03/06/17 1638   BP: 141/82     150/71   Resp:       18   Temp:       97.7  F (36.5  C)   TempSrc:       Oral   SpO2:   97% 95% 95%   Weight:       69.8 kg (153 lb 14.1 oz)   Height:       1.702 m (5' 7\")     Came from ED complaining of pain in the R hip  And back.Pt is confused not using the call light. CT spine done .Pt uses bedpan to void.Tolerating diet.Brase need to be measured tomorrow.Bed alarm on  .Pt on bed rest.Will monitor.      "

## 2017-03-07 NOTE — PLAN OF CARE
"Problem: Goal Outcome Summary  Goal: Goal Outcome Summary  Outcome: No Change  /71 (BP Location: Left arm)  Temp 95.5  F (35.3  C) (Oral)  Resp 16  Ht 1.702 m (5' 7\")  Wt 69.8 kg (153 lb 14.1 oz)  LMP  (LMP Unknown)  SpO2 95%  BMI 24.1 kg/m2     2300- 0730: VSS, Afebrile.  Pt calm and cooperative.  Pt alert and oriented to self.  Disoriented to time, place and situation.  Pt pulled out IV in the am, bed sheets and linen changed.  Pt compliant and calm with cares.  No complaints of nausea or SOB.  Pt complaining of pain in right knee and back pain.  Given tramadol x2 and tylenol.  Pt given Klonopin at bedtime due to request for sleep aide.  Pt resting off and on during the shift.  Educated on log rolling and spinal precautions.  Bed alarm on.  Pt to be fitted for brace tomorrow. Otherwise comfortable, continue with POC.       "

## 2017-03-07 NOTE — PLAN OF CARE
Problem: Goal Outcome Summary  Goal: Goal Outcome Summary  Outcome: No Change  RN [id number]  B/P: 130/70, T: 97.7, P:76 , R: 16  Patient alert and oriented to self only.  Agitated at times, received Klonopin x 3 with relief.  Patient reports pain in right side, Ultram given x 1.  Patient refused to take max dose of Tylenol.  CMS intact. Neuro intact.  Patient up with assist of two.  Patient refused to wear the back brace used for support.  Appetite fair, needs help with ordering and eating. Voiding using bed pan, and incontinent at times.  Patient is unable to use call light to make needs know, bed alarm on.  Patient tried to get out of bed a couple times this evening before her Klonopin dose.  Abdomen soft with bowel sounds present, patient passing flatus but no BM today.  Continue with POC.

## 2017-03-07 NOTE — PROGRESS NOTES
[facility], [place]    Neurosurgery Daily Progress Note         Assessment   Tasha Rob is a 76 year old female who we have been consulted regarding an L4 compression fracture.          Plan     Continue current pain control regimen.    Will follow-up on lumbar spine CT and discuss with staff today. Plan at this point is to brace.     Routine neuro exams per unit protocol.    HOB > 30 degrees.    Incentive spirometry Q1H while awake.    Regular diet.    Bowel regimen. Anti-emetics.     Dispo; Stable on 7B    Please dial * * * and enter job code [phone] to reach the neurosurgery team if you have any questions.    Patient and above plan discussed with neurosurgery chief resident.         Subjective     Overnight events: No acute events overnight. .           Objective   Temp:  [95.8  F (35.4  C)-98.2  F (36.8  C)] 95.8  F (35.4  C)  Heart Rate:  [77-90] 77  Resp:  [16-18] 16  BP: (111-158)/(56-82) 158/74  SpO2:  [95 %-100 %] 95 %    I/O last 3 completed shifts:  In: 350 [P.O.:350]  Out: 800 [Urine:800]    Physical Exam  General: Appears comfortable, NAD  Neurologic Exam:  - AOx1.  - Follows commands.  - Speech fluent, spontaneous. No aphasia or dysarthria.  - No gaze preference. No apparent hemineglect.  - PERRL, EOMI.  - Face symmetric with sensation intact to light touch.  - Palate elevates symmetrically, uvula midline, tongue protrudes midline.  - Trapezii and sternocleidomastoid muscles 5/5 bilaterally.  - No pronator drift.  Motor: Normal bulk/tone; no tremor, rigidity, or bradykinesia.     Delt Bi Tri FE IP Quad Hamst TibAnt EHL Gastroc    C5 C6 C7 C8/T1 L2 L3 L4-S1 L4 L5 S1   R 5 5 5 5 5 5 5 5 5 5   L 5 5 5 5 5 5 5 5 5 5     Sensory:  intact to LT      Labs and Imaging     BMP  Recent Labs  Lab 03/06/17  1327      POTASSIUM 4.1   CHLORIDE 106   ROGELIO 9.0   CO2 26   BUN 19   CR 0.57   *     CBC  Recent Labs  Lab 03/06/17  1742 03/06/17  1327   WBC  --  9.5   RBC  --   4.05   HGB  --  12.6   HCT  --  37.9   MCV  --  94   MCH  --  31.1   MCHC  --  33.2   RDW  --  14.4   * 133*     Imaging:   CT Lumbar Spine (3/6/2017): L3 and L4 compression fractures.     Contact the neurosurgery resident on call with questions.    Dial * * *[phone]: Enter [phone] when prompted.   Zeferino Jasmine MD, PhD  Neurosurgery PGY-2

## 2017-03-07 NOTE — SIGNIFICANT EVENT
"SPIRITUAL HEALTH SERVICES Significant Event  [facility]osque Sacrament of ANOINTING  [facility] ([place]) 7B    I anointed patient per request of patient    I responded to epic consult for request to visit with Cam. I note that she [facility]. She has dementia, but is able to identify herself as Mosque. Her notes indicate she has had a fall. Cam is able to identify names of her late parents and her  sister, but speaks of them in the present tense. She also states that she has had strokes and \"nervous problems.\" She remembers the Religious ([employer]) in the neighborhood she was raised and the high school she attended ([employer]).     I ask if she would welcome prayer and she joined me in the Lord's Prayer and I shared Anointing of the   Sick.  Father Johnathan Boss       "

## 2017-03-07 NOTE — PROGRESS NOTES
[facility], [place]  Trauma Service Progress Note    Date of Service (when I saw the patient): 03/07/2017     Assessment & Plan     Trauma mechanism:Presumed fall from standing   Time/date of injury: unknown; possibly Friday night   Known Injuries:  1. L4 compression fracture   2. Progression of L3 compression fracture since previous exam   Other diagnoses:   1. Acute pain   2. Alzheimer's Dementia  3. CVA  4. HLD  5. HTN  6. Chronic low back pain/sciatica  7. Generalized anxiety disorder  8. GERD   9. Vertigo  10. Chronic constipation       Procedure:  None at this time     Plan:  1. Unable to perform tertiary exam due to mental status. According to nursing patient c/o knee pain overnight. Not able to elicit c/o knee pain on exam this AM.  Imaging reviewed. No additional acute bony abnormalities noted.  Patient has extensive osteopenia.    2. Neurosurgery consult: L4 superior endplate compression fracture which is new since last imaging in May, 2016. Also now with progression of L3 superior endplate compression deformity. NSG recommend chair back brace when OOB.  Bedrest with spinal precautins until placed. Orthosis consulted; anticipate brace placement today.  It is unclear how acute these injuries are, given patient's severe memory loss. Patient does have known history of chronic low back pain/sciatica, osteopenia, and degenerative changes of lumbar spine.    3. Ortho consult: No definitive evidence of acute fractures of lower extremities/hips. Patient has chronic pain/sciatica in right hip for past three months,  recently received an injection in left hip which was very helpful in relieving pain. Patient due to injection in March.  ? If ortho can do an injection while inpatient.   4. Alzheimer's dementia: memory loss which is quite progressed. Patient  disoriented to time, place, situation on exam. Continue PTA Namenda. May need bedside attendant for safety given patient history of  "recent falls.   5. Anxiety/agitation: PTA Clonazepam available PRN. Will make PRN Haldol available in the event of agitation. Patient has a history of paranoid delusions, particularly at night. Did fine overnight with PRN Clonazepam. No Haldol needed.    6. PT/OT: eval and treat as indicated when able to get OOB. Patient currently is in an \"assisted living\" type of memory care facility with an independent apartment and help with meds, meals, etc. Will likely need escalation of care      Code status: DNR/DNI confirmed with patient/friend/POLST   General Cares:  GI Prophylaxis: Resume PTA omeprazole  DVT Prophylaxis: sq heparin  Date of last stool/Bowel Regimen:unknown. Resume PTA senna/miralax daily, PRN dulcolax suppository   Pulmonary toilet:IS; PRN albuterol inhaler     Interval History   Course reviewed.  No acute events overnight. Patient alert and disoriented on exam today, but calm and conversant. Mostly nonsensical conversation. C/o pain in low back.  Had CT scan of lumbar spine last night. Bedrest until fitted with chairback brace today.  Then PT/OT for dispo planning.  Thalia VALLE, updated at bedside this AM.    Patient denies sob, c/p, palpitations.   ROS x 8 negative with exception of those things listed in interval hx    Physical Exam   Temp: 95.5  F (35.3  C) Temp src: Oral BP: 140/71   Heart Rate: 73 Resp: 16 SpO2: 95 % O2 Device: None (Room air)    Vitals:    03/06/17 1638   Weight: 69.8 kg (153 lb 14.1 oz)     Vital Signs with Ranges  Temp:  [95.5  F (35.3  C)-98.2  F (36.8  C)] 95.5  F (35.3  C)  Heart Rate:  [73-90] 73  Resp:  [16-18] 16  BP: (111-158)/(56-82) 140/71  SpO2:  [95 %-100 %] 95 %  I/O last 3 completed shifts:  In: 350 [P.O.:350]  Out: 800 [Urine:800]      [place] Coma Scale - Total 14/15 (V4)     Constitutional: Awake, alert, cooperative, no apparent distress  Eyes: Lids and lashes normal, pupils equal, round and reactive to light, extra ocular muscles intact, sclera clear, conjunctiva " normal.  ENT: Normocephalic, atraumatic  Respiratory: No increased work of breathing, good air exchange, clear to auscultation bilaterally, no crackles or wheezing.  Cardiovascular:  regular rate and rhythm, several irregular beats noted, normal S1 and S2, no S3 or S4, and no murmur noted.  GI: Normal bowel sounds, soft, non-distended, non-tender, no guarding  Genitourinary:  No urine to assess  Skin:  Normal skin color, no redness, warmth, or swelling, no ecchymosis, no abrasions, and no jaundice.  Musculoskeletal: There is tenderness of both hips on palpation.  Low back pain noted while turning/repositioning.   Pedal pulse palpated. CMS intact   Neurologic: Awake, alert, oriented to self only.  Cranial nerves II-XII are grossly intact.  Strength and sensory is intact.  No focal deficits  Neuropsychiatric: Calm, normal eye contact, alert    Medications        calcium-vitamin D  1 tablet Oral BID     memantine  10 mg Oral BID     montelukast  10 mg Oral Daily     omeprazole  20 mg Oral Daily     polyethylene glycol  17 g Oral Daily     senna-docusate  2 tablet Oral BID     acetaminophen  975 mg Oral Q8H     lidocaine  1-3 patch Transdermal Q24h    And     lidocaine   Transdermal Q24H    And     lidocaine   Transdermal Q8H     heparin  5,000 Units Subcutaneous Q12H       Data   Recent Results (from the past 24 hour(s))   Head CT w/o contrast    Narrative    CT HEAD W/O CONTRAST 3/6/2017 12:38 PM    History: trauma    Comparison: Brain MRI 8/17/2013.    Technique: Using multidetector thin collimation helical acquisition  technique, axial, coronal and sagittal CT images from the skull base  to the vertex were obtained without intravenous contrast.     Findings:    No significant change in multiple chronic right cerebellar hemisphere  and bilateral (left greater than right) basal ganglia lacunar  infarcts. No new loss of gray-white matter differentiation. Stable  mild generalized cerebral and cerebellar parenchymal  volume loss.  Ventricles are not enlarged out of proportion to the cerebral sulci.  No intracranial hemorrhage, mass effect, midline shift or abnormal  extra axial fluid collection.    Calvarium is intact. Paranasal sinuses and mastoid air cells are  clear.      Impression    Impression:   1. No acute intracranial pathology.  2. Stable moderate chronic small vessel ischemic disease and mild  generalized parenchymal volume loss.    CHANDRA DICKSON MD   Cervical spine CT w/o contrast    Narrative    CT CERVICAL SPINE W/O CONTRAST 3/6/2017 12:38 PM    Provided History: pain/trauma    Comparison: None available.    Technique: Using multidetector thin collimation helical acquisition  technique, axial, coronal and sagittal CT images through the cervical  spine were obtained without intravenous contrast.     Findings:    Normal alignment of the cervical vertebrae. No fracture or traumatic  subluxation. No abnormal prevertebral soft tissue swelling.    Normal cervical lordosis. Exaggeration of the normal thoracic  kyphosis. Degenerative fusion of the posterior elements bilaterally at  C2-C3 and C4-C6. Multilevel disc degeneration with loss of multilevel  facet hypertrophy and uncinate spurring, which results in moderate  right neural foraminal stenosis at C6-C7. Disc height, disc  calcification and opposing endplate sclerosis at C6-C7. Spinal canal  is patent. Paraspinous soft tissues are normal. Visualized lung apices  are clear.      Impression    IMPRESSION:  1. No evidence of fracture or traumatic subluxation of the cervical  spine.  2. Multilevel cervical spondylosis.    CHANDRA DICKSON MD   Lumbar spine XR, 2-3 views   Result Value    Radiologist flags (Urgent)     New L4 superior endplate compression fractures since    Narrative    2 views lumbar spine radiographs    History: trauma/pain    Comparison: 5/31/2016    Findings:    AP and lateral  views of the lumbar spine were obtained.    5  lumbar type vertebral  bodies are assumed for the purpose of this  dictation. Posterior elements of spine are not well assessed due to  underpenetration and incomplete inclusion in the field-of-view.    Since comparison study from May 2016, new superior endplate  compression fracture of L4. The superior endplate compression  deformity of L3 has also progressed.    Multilevel degenerative changes of spine with moderate to severe disc  space loss at L5-S1. Trace anterolisthesis of L4 on L5.    Bones appear osteopenic. Atherosclerotic calcification of aorta.      Impression    IMPRESSION:  1. Since May 2016, new L4 superior endplate compression fracture and  progressed L3 superior endplate compression fracture.  2. Osteopenia.    [Urgent Result: New L4 superior endplate compression fractures since  comparison.]    Finding was identified on 3/6/2017 1:16 PM.     Dr. Beaver was contacted by Dr. Hair at 3/6/2017 1:34 PM and  verbalized understanding of the urgent finding.     STEPHANY HAIR   Pelvis XR, 1-2 views    Narrative    Exam: Single AP view of pelvis radiograph    HISTORY: Pain/trauma.    FINDINGS:    Single AP view of pelvis demonstrates no acute osseous abnormality.    Degenerative changes of visualized lumbar spine. Sacrum and coccyx are  obscured by moderate to large amount of fecal content in bowel gas.  Bones appear osteopenic.    Mild to moderate degenerative changes of bilateral hips with  associated joint space loss superiomedially. Enthesopathic changes of  the pelvis and trochanters. Pelvic phlebolith.      Impression    IMPRESSION:  1. No acute osseous abnormality.  2. Osteopenic appearance.    STEPHANY HAIR   XR Femur Bilateral 2 Views    Narrative    Exam: 2 views bilateral femur, 2 views bilateral tibia/fibular  radiographs    HISTORY: Pain/trauma.    COMPARISON: None.    AP and lateral views of the each femur and tibia/fibular were  obtained.    Bones appear osteopenic.    Right:    Degenerative changes of  right hip. Mild contour irregularity of the  medial aspect of the femoral head neck junction on frog leg lateral  views are presumably related to osteophyte. Degenerative changes of  right knee.    Vascular calcifications. Plantar calcaneal enthesophyte.    Left: Degenerative changes of the left hip. Mild contour irregularity  of the medial aspect of the femoral head neck junction on frog leg  lateral view are presumably related to osteophyte. Degenerative  changes of left knee.    Bony proliferation of medial malleolus, in keeping with remote trauma.  Vascular calcifications.      Impression    IMPRESSION:  1. No definite evidence of acute osseous abnormality in either femur,  tibia, or fibula.  2. Mild contour irregularity of the medial aspect of the femoral head  neck junction on frog leg lateral views are presumably related to  osteophyte. However, if high clinical suspicion of hip fracture,  consider MRI given underlying osteopenia.    Findings discussed with Dr. Beaver by Dr. Hair.    STEPHANY HAIR   XR Tibia & Fibula Bilateral 2 Views    Narrative    Exam: 2 views bilateral femur, 2 views bilateral tibia/fibular  radiographs    HISTORY: Pain/trauma.    COMPARISON: None.    AP and lateral views of the each femur and tibia/fibular were  obtained.    Bones appear osteopenic.    Right:    Degenerative changes of right hip. Mild contour irregularity of the  medial aspect of the femoral head neck junction on frog leg lateral  views are presumably related to osteophyte. Degenerative changes of  right knee.    Vascular calcifications. Plantar calcaneal enthesophyte.    Left: Degenerative changes of the left hip. Mild contour irregularity  of the medial aspect of the femoral head neck junction on frog leg  lateral view are presumably related to osteophyte. Degenerative  changes of left knee.    Bony proliferation of medial malleolus, in keeping with remote trauma.  Vascular calcifications.      Impression     IMPRESSION:  1. No definite evidence of acute osseous abnormality in either femur,  tibia, or fibula.  2. Mild contour irregularity of the medial aspect of the femoral head  neck junction on frog leg lateral views are presumably related to  osteophyte. However, if high clinical suspicion of hip fracture,  consider MRI given underlying osteopenia.    Findings discussed with Dr. Beaver by Dr. Mcdonough.    STEPHANY Sierra, FRITZ CNP  To contact the trauma service use job code pager [phone],   Numeric texts or alpha text through [facility]

## 2017-03-08 NOTE — PLAN OF CARE
Problem: Goal Outcome Summary  Goal: Goal Outcome Summary  PT: CX PT session today. Per OT pt having increased paranoia and agitation with pt grabbing at therapist and throwing objects. Pt scheduled for tomorrow.

## 2017-03-08 NOTE — PROGRESS NOTES
[facility], [place]  Trauma Service Progress Note    Date of Service (when I saw the patient): 03/08/2017     Assessment & Plan     Trauma mechanism:Presumed fall from standing   Time/date of injury: unknown; possibly Friday night   Known Injuries:  1. L4 compression fracture   2. Progression of L3 compression fracture since previous exam   Other diagnoses:   1. Acute pain   2. Alzheimer's Dementia  3. CVA  4. HLD  5. HTN  6. Chronic low back pain/sciatica  7. Generalized anxiety disorder  8. GERD   9. Vertigo  10. Chronic constipation       Procedure: None at this time      Plan:  1. Unable to perform tertiary exam due to mental status.Serial examinations performed without revealing injuries. Patient has extensive osteopenia. Takes acetaminophen for arthritic pain at baseline.  2. Neurosurgery consult: L4 superior endplate compression fracture which is new since last imaging in May, 2016. Also now with progression of L3 superior endplate compression deformity. NSG recommend chair back LSO brace when OOB.  It is unclear how acute these injuries are, given patient's severe memory loss. Patient does have known history of chronic low back pain/sciatica, osteopenia, and degenerative changes of lumbar spine. Follow-up in Neurosurgery clinic with our Physician Assistant in 3 months with upright x-rays to assess the compression fracture at that time.   3. Ortho consult: No definitive evidence of acute fractures of lower extremities/hips. Patient has chronic pain/sciatica in right hip for past three months, recently received an injection in left hip which was very helpful in relieving pain. Patient due to injection in March. ? If ortho can do an injection while inpatient.   4. Pain control: scheduled APAP, increase tramadol from 25 mg to 25-50mg PRN. Add Gabapentin 300 mg q HS for nerve pain.   5. Alzheimer's dementia: memory loss which is quite progressed. Patient disoriented to time, place,  "situation on exam. Continue PTA Namenda. May need bedside attendant for safety given patient history of recent falls.   6. Anxiety/agitation: PTA Clonazepam available PRN. PRN Haldol available in the event of agitation. Patient has a history of paranoid delusions, particularly at night.  7. PT/OT: eval and treat as indicated when able to get OOB.   8. SW: Patient currently is in an \"assisted living\" type of memory care facility with an independent apartment and help with meds, meals, etc. Will likely need escalation of care       Code status: DNR/DNI confirmed with patient/friend/POLST   General Cares:  GI Prophylaxis: Resume PTA omeprazole  DVT Prophylaxis: sq heparin BID   Date of last stool/Bowel Regimen:unknown. Resume PTA senna/miralax daily, PRN dulcolax suppository   Pulmonary toilet:IS; PRN albuterol inhaler     Interval History   Course reviewed. Patient appears calm in bed, however does grab onto stethoscope with strong -challenging to re-orient. POA at bedside, reports mental status has declined since hospital admission. Unable to perform ROS, but does not appear in any acute distress. On RA.     ROS x 8 negative with exception of those things listed in interval hx    Physical Exam   Temp: 97.3  F (36.3  C) Temp src: Axillary BP: 183/86 Pulse: 87 Heart Rate: 76 Resp: 20 SpO2: 97 % O2 Device: None (Room air)    Vitals:    03/06/17 1638   Weight: 69.8 kg (153 lb 14.1 oz)     Vital Signs with Ranges  Temp:  [96.9  F (36.1  C)-99.6  F (37.6  C)] 97.3  F (36.3  C)  Pulse:  [87-92] 87  Heart Rate:  [76-86] 76  Resp:  [16-20] 20  BP: (118-183)/(52-86) 183/86  SpO2:  [93 %-98 %] 97 %  I/O last 3 completed shifts:  In: 390 [P.O.:390]  Out: 375 [Urine:375]      [place] Coma Scale - Total 14/15    Constitutional: Awake, alert, cooperative, no apparent distress  Eyes: Lids and lashes normal, pupils equal, round and reactive to light, extra ocular muscles intact, sclera clear, conjunctiva normal.  ENT: " Normocephalic, atraumatic  Respiratory: No increased work of breathing, good air exchange, clear to auscultation bilaterally, no crackles or wheezing.   Cardiovascular: regular rate and rhythm, several irregular beats noted, normal S1 and S2, no S3 or S4, and no murmur noted.  GI: Normal bowel sounds, soft, non-distended, non-tender, no guarding  Genitourinary: No urine to assess.   Skin: Normal skin color, no redness, warmth, or swelling, no ecchymosis, no abrasions, and no jaundice.  Musculoskeletal: There is tenderness of both hips on palpation. Low back pain noted while turning/repositioning.  Pedal pulse palpated. CMS intact   Neurologic: Awake, alert, oriented to self only. Cranial nerves II-XII are grossly intact. Strength and sensory is intact. No focal deficits.   Neuropsychiatric: Calm, normal eye contact, alert  Medications        calcium-vitamin D  1 tablet Oral BID     memantine  10 mg Oral BID     montelukast  10 mg Oral Daily     omeprazole  20 mg Oral Daily     polyethylene glycol  17 g Oral Daily     senna-docusate  2 tablet Oral BID     acetaminophen  975 mg Oral Q8H     lidocaine  1-3 patch Transdermal Q24h    And     lidocaine   Transdermal Q24H    And     lidocaine   Transdermal Q8H     heparin  5,000 Units Subcutaneous Q12H       Data   No results found for this or any previous visit (from the past 24 hour(s)).    Nicole Steiner NP  To contact the trauma service use job code pager [phone],   Numeric texts or alpha text through [facility]

## 2017-03-08 NOTE — PROGRESS NOTES
SPIRITUAL HEALTH SERVICES Progress Note  [facility] ([place]) 7B  Follow up visit with pt (Tasha) per [facility] consult order. Pt was alert, but very confused. Yesterday, she received the sacrament of the sick. She talked about his good relationship with friends and family members. Tasha said that she is not  and has no children. Her children are people she meets with at school. She also talked about God, but couldn't explicitly explain her ideas or engaged in reflective conversation. After listening to pt for a long period of time and asking few questions, we concluded our visit with a prayer. Tasha said thank you when I was leaving the room. This unit  will continue to f/u, but [employer] priests are also available if necessary.                                                                              Father Sriram Daviesest

## 2017-03-08 NOTE — PROGRESS NOTES
Brief Neurosurgery Update:     TLSO fit for patient 3/7/2017.     Ok for patient to wear when out of bed.     She can follow-up in Neurosurgery clinic with our Physician Assistant in 3 months with upright x-rays to assess the compression fracture at that time.     Neurosurgery signing off.     Contact the neurosurgery resident on call with questions.    Dial * * [phone]: Enter [phone] when prompted.   Zeferino Jasmine MD, PhD  Neurosurgery PGY-2

## 2017-03-08 NOTE — PROGRESS NOTES
03/08/17 0903   Quick Adds   Type of Visit Initial Occupational Therapy Evaluation   Living Environment   Lives With facility resident   Living Arrangements extended care facility   Home Accessibility no concerns   Number of Stairs to Enter Home 0   Number of Stairs Within Home 0   Living Environment Comment Patient lives in a memory care unit with 24 hour staff.   Self-Care   Usual Activity Tolerance moderate   Current Activity Tolerance poor   Regular Exercise yes   Activity/Exercise Type walking   Equipment Currently Used at Home walker, rolling;grab bar;shower chair   Activity/Exercise/Self-Care Comment POA reports patient was walking with the walker in the halls for exercise   Functional Level Prior   Ambulation 1-->assistive equipment   Transferring 1-->assistive equipment   Toileting 1-->assistive equipment   Bathing 3-->assistive equipment and person   Dressing 2-->assistive person   Eating 0-->independent   Communication 0-->understands/communicates without difficulty   Swallowing 0-->swallows foods/liquids without difficulty   Cognition 1 - attention or memory deficits   Fall history within last six months yes   Number of times patient has fallen within last six months 2   Prior Functional Level Comment POSOLIS Vásquez present and reporting that normally patient is able to walk (I)ly with a walker, use the bathroom (I)ly. Needed cues for ADL and finding things around the unit. Patient was living in her own home until October.   General Information   Onset of Illness/Injury or Date of Surgery - Date 03/06/17   Referring Physician Crystal Sierra APRN CNP   Patient/Family Goals Statement POA reports goal is to return to memory care with assist, but states that unit would not be able to handle this current level of function.   Additional Occupational Profile Info/Pertinent History of Current Problem Patient with advanced dementia brought in to ER with back and hip pain, and found to have advancing of L3  compression fx and new L4 compression fx. Patient has had 2 recent falls   Precautions/Limitations spinal precautions  (TLSO when out of bed)   Weight-Bearing Status - LLE weight-bearing as tolerated   Weight-Bearing Status - RLE weight-bearing as tolerated   General Observations Resting in bed with LEONARD Vásquez present. Patient has been agitated at times   Cognitive Status Examination   Orientation person   Level of Consciousness alert;combative;agitated   Able to Follow Commands severe impairment   Personal Safety (Cognitive) severe impairment   Memory impaired   Attention Distractible during evaluation   Cognitive Comment Severely impaired cognition, with recent worsening per POA   Sensory Examination   Sensory Comments Denies numbness/tingling   Pain Assessment   Patient Currently in Pain Yes, see Vital Sign flowsheet   Range of Motion (ROM)   ROM Comment Does not follow cues for assessment; appears WFL in UEs   Strength   Strength Comments Does not follow cues fot testing. Appears WFL but limited by pain   Hand Strength   Hand Strength Comments Very strong ; grabs at therapist and other staff   Muscle Tone Assessment   Muscle Tone Quick Adds No deficits were identified   Coordination   Upper Extremity Coordination No deficits were identified   Mobility   Bed Mobility Comments Attempted to get out of bed to use bathroom, needed Mod A to roll. Patient became unsafe to mobilize with agressive and combative behaviors   Transfer Skill: Bed to Chair/Chair to Bed   Level of Gordon: Bed to Chair unable to perform   Transfer Skill: Sit to Stand   Level of Gordon: Sit/Stand unable to perform   Transfer Skill: Toilet Transfer   Level of Gordon: Toilet unable to perform   Balance   Balance Comments Unable to assess with patient's mentation/behaviors   Bathing   Level of Gordon dependent (less than 25% patients effort)   Upper Body Dressing   Level of Gordon: Dress Upper Body dependent (less  than 25% patients effort)   Lower Body Dressing   Level of Anna: Dress Lower Body dependent (less than 25% patients effort)   Toileting   Level of Anna: Toilet dependent (less than 25% patients effort)   Grooming   Level of Anna: Grooming maximum assist (25% patients effort)   Eating/Self Feeding   Level of Anna: Eating minimum assist (75% patients effort)   Instrumental Activities of Daily Living (IADL)   IADL Comments Had assist with all IADL   Activities of Daily Living Analysis   Impairments Contributing to Impaired Activities of Daily Living cognition impaired;pain;strength decreased;balance impaired   General Therapy Interventions   Planned Therapy Interventions ADL retraining;cognition;transfer training;risk factor education   Clinical Impression   Criteria for Skilled Therapeutic Interventions Met yes, treatment indicated   OT Diagnosis impaired ADL and mobility   Influenced by the following impairments mentation, behaviors, pain, TLSO   Assessment of Occupational Performance 3-5 Performance Deficits   Identified Performance Deficits ADL, IADL, mobility, cognition, psycho-social   Clinical Decision Making (Complexity) Moderate complexity   Therapy Frequency daily   Predicted Duration of Therapy Intervention (days/wks) 1 week   Anticipated Equipment Needs at Discharge shower chair;bedside commode   Anticipated Discharge Disposition Long Term Care Facility;Transitional Care Facility   Risks and Benefits of Treatment have been explained. Yes   Patient, Family & other staff in agreement with plan of care Yes   Total Evaluation Time   Total Evaluation Time (Minutes) 20

## 2017-03-08 NOTE — PLAN OF CARE
Problem: Goal Outcome Summary  Goal: Goal Outcome Summary  Outcome: Therapy, progress toward functional goals as expected  OT 7B: OT deni completed and treatment initiated. Patient demonstrating paranoia, agitation, grabbing at therapist's name badge and body, and threw a stethoscope she had grabbed away from the doctor. Wondering if she would need mitts on hands or restraints, as she does not allow care to be provided due to behaviors. Reported needing to use the bathroom, but because of unsafe behaviors, unable to get out of bed or even attempt to change patient.     LEONARD Vásquez present and reports that patient was living in her own home until October, when she moved to a memory care apartment at [facility]. She is normally calm and cooperative, except occasionally wakes up agitated at night. She normally walks with a walker, toilets herself and has occasional help for dressing tasks. Gets assistance with showers, all meals, laundry, etc. Thalia reports that her current living situation would unlikely be able to handle what level of assistance she is currently needing, as patient is in her own small apartment during the day.     OT will try to see 5x/week, pending mentation and behaviors. Trauma service to report back to staff whether TLSO brace is actually needed or not, seeing how neurosurgery note indicates that it is for comfort, but patient not tolerating it. Recommend discharge to SNF with close 24 hour care when able.

## 2017-03-08 NOTE — PLAN OF CARE
Problem: Goal Outcome Summary  Goal: Goal Outcome Summary  Outcome: No Change  Vitals:     03/07/17 2223 03/08/17 0412 03/08/17 0828 03/08/17 1229   BP: 154/77 138/62 183/86 141/70   BP Location: Left arm Left arm Left arm Left arm   Pulse: 92   87     Resp: 16 16 20 16   Temp: 99.6  F (37.6  C) 96.9  F (36.1  C) 97.3  F (36.3  C) 97.4  F (36.3  C)   TempSrc: Oral Axillary Axillary Oral   SpO2: 93% 98% 97% 94%   Weight:           Height:             AVSS, pt disoriented x 3, frequent complaints of pain and frequently shouting out for help, PO tramadol given x 2 and PO tylenol given x 1 with some relief. Pt refusing back brace. Repositioned q-2 hours. Voiding adequate via bedpan, occasionally incontinent. No BM, +BS. Continuing to monitor per POC.

## 2017-03-08 NOTE — PROGRESS NOTES
Social Work Services Progress Note    Hospital Day: 3  Collaborated with:  Chart review, team rounds, Trauma team, pt's POA/friend Thalia ([phone] or [phone]), [facility]    Data:  Received update from Trauma team that pt is ready to d/c today. Per Trauma team pt is from an Assisted Living facility with Memory Care.   Received update from rehab staff that TCU/memory care unit is being recommended for pt as pt's POA/friend Thalia reported that pt's facility would not be able to handle pt's current level of function. Per OT eval goal is for pt to return to memory care unit with assist. OT staff report that pt/pt's POA report pt's facility is part of a [facility] and they hope pt can stay within the [facility] system.   Received update from OT staff that pt is not currently oriented at all, is paranoid, and combative.     Intervention:  George went to pt's room to meet with POA/health care agent Thalia and pt but was told that Thalia had just left. George then tried calling Thalia and left a vm asking that she return call to discuss d/c planning, waiting to hear back.     George called [facility] (p. [phone], f. [phone])- spoke with Melinda in admissions and she reported there is a bed available, there is a locked memory care unit at the facility, they try and keep the [facility] residents within the care system if they need TCU, and she is willing to assess pt. Referral has been faxed, waiting to hear back. George called back to inquire as to status of referral, vm left for admissions staff, waiting to hear back. Lisa in admissions called back reporting facility can accept pt to facility but not until tomorrow, and admission anytime would be ok. Pt would be accepted to the long term secured memory care unit. Lisa reported she needs to contact Thalia regarding a physician to follow pt for care at facility, so they are not able to accept pt today as they need time to arrange  logistics with this.    Sw called Thalia (POA/health care agent) and left vm with generic update and asked that she return call, waiting to hear back.      Tentative stretcher transport arranged via [employer] ([phone]) for Thursday at 1:30. Sw called Thalia and left vm informing her of d/c plan and transport time.     Assessment:  TCU, see bedside RN, PT/OT notes    Plan:    Anticipated Disposition:  Facility:  Accepted on the long term locked memory care unit at [facility] [place]    Barriers to d/c plan:  Facility requesting admission on Thursday to arrange logistics for provider to follow, confirmation of plan with pt's POA/healthcare agent    Follow Up:  TBD, bruno will continue to follow for d/c planning. Bruno will ask covering sw tomorrow to follow up with Thalia regarding plan.     Erika Coleman, REGAN, MSW  7B   [phone] (pager) [phone]  3/8/2017

## 2017-03-08 NOTE — PLAN OF CARE
Problem: Goal Outcome Summary  Goal: Goal Outcome Summary  Outcome: No Change  Pt continues to be confused to time and place.When talking is not making any sense etc.Pt dnied pain until bedtime and medicated with Ultram with relief  Of pain and feel asleep.Pt was incontinent x2 on evening shift.Has attends on and encouraged to drink but not taking much tonight.Bed alarm on and call light at bedside.Will continue POC.

## 2017-03-08 NOTE — PLAN OF CARE
"Problem: Goal Outcome Summary  Goal: Goal Outcome Summary  Outcome: No Change  /62 (BP Location: Left arm)  Pulse 92  Temp 96.9  F (36.1  C) (Axillary)  Resp 16 Ht 1.702 m (5' 7\")  Wt 69.8 kg (153 lb 14.1 oz)  LMP  (LMP Unknown)  SpO2 98%  BMI 24.1 kg/m2     1897-8617: VSS, Afebrile.  Pt disoriented to place, time and situation.  Pt calm and cooperative, sleeping during entire shift.  No complaints of nausea, SOB or pain.  Pt tolerating regular diet.  Incontinent of urine overnight, changed x1.  Pt log rolled and educated on staying flat in bed.  Pt otherwise comfortable, continue with POC.       "

## 2017-03-09 NOTE — PLAN OF CARE
"Problem: Goal Outcome Summary  Goal: Goal Outcome Summary  Outcome: No Change  /64 (BP Location: Left arm)  Pulse 87  Temp 96  F (35.6  C) (Oral)  Resp 16  Ht 1.702 m (5' 7\")  Wt 69.8 kg (153 lb 14.1 oz)  LMP  (LMP Unknown)  SpO2 93%  BMI 24.1 kg/m2     8569-9421: VSS, Afebrile.  Pt alert to self disoriented to place, time and situation.  Pt was sleeping well then awoke in panic when in room alone.  Pt was easy to redirect and calm . Given Toradol for complaints of pain, managed pain well.  Incontinent of urine overnight.  Brief changed by HCP x2.  Pt otherwise comfortable, continue with POC.      Addendum:  Around 0600 pt became combative with writer.  Grabbing at her stethoscope and badge.  Pt began flailing her arms around in an attempt to hit.  Pt was able to be redirected with multiple attempts. Pt stated she \"wanted to just die.  And wished someone would help her do it\".  No actual plan or idea on the statement\".    "

## 2017-03-09 NOTE — SIGNIFICANT EVENT
Patient found at side of bed on her knees as staff entered room in response to bed alarm.  Right arm/hand  holding on to bed rail.   Patient help to lay down on floor and lifted back to bed with ceiling lift.  No signs of head trauma, patient remains confused.  No noted skin injury.  Moving all extremities.  C/o pain in back and legs, same as before fall.  Trauma team informed and here to see patient.

## 2017-03-09 NOTE — PROGRESS NOTES
Social Work Services Progress Note    Hospital Day: 4  Collaborated with:  LEONARD Vásquez; KARLIE Rivera; Lisa in Admissions at [facility] [phone]    Data:  Lisa in Admissions at [facility] states they can accept pt into their locked memory care unit when pt is medically stable.  Dr. Paulina Victor and Drea Tsang -[phone] will follow pt at the SNF.  Initially, KARLIE Rivera anticipated pt to be ready today however later asked that pt's discharge be delayed until tomorrow, Fri.    Intervention:  SW delayed pt's ride until tomorrow, Friday, at 11:30am with [employer].  Since pt is transporting via stretcher, PCS form completed and placed in pt's chart.    PAS completed, [id number].    CHEPE met with pt's POA Thalia, when the thought was that pt would discharge today.  Thalia concerned that pt have a bowel movement and that her pain be under better control so that pt could participate better with PT/OT.  Given pt's advanced dementia, she is not sure what would be the best plan of care.  Prefers pt try PT/OT at the SNF to see if she can rehabilitate though then wonders if that doesn't work if pt should be placed on hospice.  She states that pt just moved into the longterm in October and is doubtful that pt will get back to her baseline of being able to live in an longterm; overwhelmed with the all the work of moving pt's belongings if pt ends up staying at the SNF.  Thalia voices concern about nursing care in the ED and that no bowel med was given here on the in-patient unit.  CHEPE gave Thalia the phone number to Patient Relations ([phone]) as she anticipates she will want to follow up with these concerns.  Supportive listening provided.    KARLIE Rivera, states that Palliative team has been consulted.    Assessment:  SNF placement with PT/OT.    Plan:    Anticipated Disposition:  Facility:  [facility]    Barriers to d/c plan:  Medical stablity (bowels and pain).      Follow Up:  CHEPE will continue  to follow.    YIN Montanez covering for   [phone] phone  [phone] pager

## 2017-03-09 NOTE — PROGRESS NOTES
Brief trauma note:     Called at bedside by nursing at 8:55 AM. Patient was found on at her bedside kneeling and shouting. Due to her advanced dementia, she could not provide history of her fall. On exam, the patient did not show evidence of head trauma. She was moving all her extremities. She complained of leg and back pain (which has been baseline). Denies any other symptoms. The patient was  placed into lift sling and safely transported back to bed with nursing and MA assistance.       Continue bed alarm, continue frequent checks on patient.     Nicole Steiner ACNP

## 2017-03-09 NOTE — PLAN OF CARE
Problem: Goal Outcome Summary  Goal: Goal Outcome Summary  OT 7B: Cancel - Pt not appropriate for OT this AM due to level of agitation, RN providing cares upon attempt this afternoon. Will reschedule pt for OT tomorrow, as  schedule does not allow for additional check back later today.

## 2017-03-09 NOTE — PROGRESS NOTES
[facility], [place]  Trauma Service Progress Note    Date of Service (when I saw the patient): 03/09/2017     Assessment & Plan     Trauma mechanism:Presumed fall from standing   Time/date of injury: unknown; possibly Friday night   Known Injuries:  1. L4 compression fracture   2. Progression of L3 compression fracture since previous exam   Other diagnoses:   1. Acute pain   2. Alzheimer's Dementia  3. CVA  4. HLD  5. HTN  6. Chronic low back pain/sciatica  7. Generalized anxiety disorder  8. GERD   9. Vertigo  10. Chronic constipation       Procedure: None at this time      Plan:  1. Unable to perform tertiary exam due to mental status.Serial examinations performed without revealing injuries. Patient has extensive osteopenia. Takes acetaminophen for arthritic pain at baseline.  2. Neurosurgery consult: L4 superior endplate compression fracture which is new since last imaging in May, 2016. Also now with progression of L3 superior endplate compression deformity. NSG recommend chair back LSO brace when OOB. It is unclear how acute these injuries are, given patient's severe memory loss. Patient does have known history of chronic low back pain/sciatica, osteopenia, and degenerative changes of lumbar spine. Follow-up in Neurosurgery clinic with our Physician Assistant in 3 months with upright x-rays to assess the compression fracture at that time.   3. Ortho consult: No definitive evidence of acute fractures of lower extremities/hips. Patient has chronic pain/sciatica in right hip for past three months, recently received an injection in left hip which was very helpful in relieving pain. Patient due to injection in March. Unlikely that orthopedics can give injection inpatient.  4. Pain control: scheduled APAP, replace tramadol with oxycodone 5-10 mg PRN today (start with 5 mg first). Add Gabapentin 300 mg q HS for nerve pain. Takes pills crushed with vanilla ice cream.  Can consider Roxicodone  liquid if patient refusing oral pills.   5. Alzheimer's dementia: memory loss which is quite progressed. Patient disoriented to time, place, situation on exam. Continue PTA Namenda. May need bedside attendant for safety given patient history of recent falls.   6. Anxiety/agitation: PTA Clonazepam increased to 0.5 mg clonazepam. PRN Haldol available in the event of agitation. Patient has a history of paranoid delusions, particularly at night.  7. PT/OT: eval and treat as indicated when able to get OOB.   8. Palliative care consult in setting of advanced dementia, co-morbidities, new injuries.    9. Potentially d/c to [facility] today vs tomorrow.       Code status: DNR/DNI confirmed with patient/friend/POLST     General Cares:  GI Prophylaxis: Resume PTA omeprazole  DVT Prophylaxis: sq heparin BID   Date of last stool/Bowel Regimen:unknown. Resume PTA senna/miralax daily, dulcolax supp today, followed by pink lady enema if no effect.   Pulmonary toilet:IS; PRN albuterol inhaler     Interval History   Course reviewed. Patient appears to be in more pain this morning. More combative and agitated.   ROS x 8 negative with exception of those things listed in interval hx    Physical Exam   Temp: 95.8  F (35.4  C) Temp src: Oral BP: 116/66   Heart Rate: 83 Resp: 16 SpO2: 96 % O2 Device: None (Room air)    Vitals:    03/06/17 1638   Weight: 69.8 kg (153 lb 14.1 oz)     Vital Signs with Ranges  Temp:  [95.8  F (35.4  C)-97.4  F (36.3  C)] 95.8  F (35.4  C)  Heart Rate:  [72-83] 83  Resp:  [12-16] 16  BP: (116-151)/(64-77) 116/66  SpO2:  [92 %-96 %] 96 %  I/O last 3 completed shifts:  In: 845 [P.O.:845]  Out: 525 [Urine:525]      Matthias Coma Scale - Total 14/15    Constitutional: Awake, alert, cooperative, no apparent distress  Eyes: Lids and lashes normal, pupils equal, round and reactive to light, extra ocular muscles intact, sclera clear, conjunctiva normal.  ENT: Normocephalic, atraumatic  Respiratory:  No increased work of breathing, good air exchange, clear to auscultation bilaterally, no crackles or wheezing.   Cardiovascular: regular rate and rhythm, several irregular beats noted, normal S1 and S2, no S3 or S4, and no murmur noted.  GI: Normal bowel sounds, soft, non-distended, non-tender, no guarding  Genitourinary: No urine to assess.   Skin: Normal skin color, no redness, warmth, or swelling, no ecchymosis, no abrasions, and no jaundice.  Musculoskeletal: There is tenderness of both hips on palpation. Low back pain noted while turning/repositioning. Pedal pulse palpated. CMS intact   Neurologic: Awake, alert, oriented to self only. Cranial nerves II-XII are grossly intact. Strength and sensory is intact. No focal deficits.   Neuropsychiatric: Calm, normal eye contact, alert    Medications        bisacodyl  10 mg Rectal Once     [START ON 3/10/2017] bisacodyl  10 mg Rectal Daily     pink lady enema  286 mL Rectal Once     gabapentin  300 mg Oral At Bedtime     calcium-vitamin D  1 tablet Oral BID     memantine  10 mg Oral BID     montelukast  10 mg Oral Daily     omeprazole  20 mg Oral Daily     polyethylene glycol  17 g Oral Daily     senna-docusate  2 tablet Oral BID     acetaminophen  975 mg Oral Q8H     lidocaine  1-3 patch Transdermal Q24h    And     lidocaine   Transdermal Q24H    And     lidocaine   Transdermal Q8H     heparin  5,000 Units Subcutaneous Q12H       Data   No results found for this or any previous visit (from the past 24 hour(s)).    Nicole Steiner NP  To contact the trauma service use job code pager [phone],   Numeric texts or alpha text through [facility]

## 2017-03-09 NOTE — PLAN OF CARE
"Problem: Individualization  Goal: Patient Preferences  /76  Pulse 87  Temp 96.1  F (35.6  C) (Axillary)  Resp 12  Ht 1.702 m (5' 7\")  Wt 69.8 kg (153 lb 14.1 oz)  LMP  (LMP Unknown)  SpO2 95%  BMI 24.1 kg/m2      VSS. Pt oriented to self only.  Frequently shouting out for help, agitated at times, PRN Clonazepam given x1 and helped.  Pt refused back brace, blanchable redness noted on upper back possible caused by the back brace.  Poor PO intake.  Refused bedtime meds.  Voiding adequate via bedpan, occasionally incontinent. Passing flatus, no BM.  Continue POC.              "

## 2017-03-09 NOTE — PLAN OF CARE
Problem: Goal Outcome Summary  Goal: Goal Outcome Summary  Outcome: No Change  Vitals:     03/09/17 0434 03/09/17 0730 03/09/17 0936 03/09/17 1200   BP: 143/64 126/77 116/66 142/80   BP Location: Left arm Left arm Left arm Left arm   Pulse:           Resp: 16 16 16 16   Temp: 96  F (35.6  C) 96.1  F (35.6  C) 95.8  F (35.4  C) 96  F (35.6  C)   TempSrc: Oral Oral   Oral   SpO2: 93% 92% 96% 91%   Weight:           Height:             AVSS, pt oriented to self only. Frequent complaints of pain, PO oxycodone given x 1 with some relief. Pt appearing very agitated most of the day, PO clonazepam given x 1 with some relief. Pt incontinent of urine, had large BM with manual assist this afternoon. Appetite fair, repositioned in bed q 2 hours. Plan to discharge to memory care this afternoon. Continuing to monitor per POC.

## 2017-03-09 NOTE — DISCHARGE SUMMARY
[facility], [place]    Discharge Summary  Trauma Surgery Service    Date of Admission:  3/6/2017  Date of Discharge:  3/10/2017  Discharging Provider: BENITEZ Gaming; Daniel Stinson MD  Date of Service (when I saw the patient): 03/10/17    Primary Provider: Jose Alberto Muniz  Primary Care clinic: [facility] [address]  [facility] [address]  Phone: [phone]  Fax number: [phone]     Discharge Diagnoses   Trauma mechanism:Presumed fall from standing   Time/date of injury: unknown; possibly Friday night   Known Injuries:  1. L4 compression fracture   2. Progression of L3 compression fracture since previous exam   Other diagnoses:   1. Acute pain   2. Alzheimer's Dementia  3. CVA  4. HLD  5. HTN  6. Chronic low back pain/sciatica  7. Generalized anxiety disorder  8. GERD   9. Vertigo  10. Chronic constipation       Hospital Course   HPI:  Tasha Rob is a 76 year old female who with history of advanced Alzheimer's dementia, chronic low back pain with degenerative changes in lumbar spine, sciatica, GERD, HTN, and anxiety who presents to [facility] ED from assisted living/memory care on 3/6 with back and hip pain. The patient is unable to provide a history given dementia. History is obtained from patient's friend Thalia, who is also power of . According to Thalia, the patient apparently told staff on Saturday morning that she had fallen on Friday night. On Saturday the patient was complaining of increased hip pain. Of note, the patient has chronic left hip pain, and had an injection approximately a month ago which significantly reduced pain. This morning, patient was experiencing increased back and hip pain, which was apparently severe and limiting ambulation and ability to toilet. Patient is not able to accurately recall recent events and it is unclear if there were any recent falls, but given increase in pain, patient was brought in by ambulance for  evaluation. Imaging was obtained and patient was found to have an L4 compression fracture which was new since previous spinal imaging in May of 2016. There was also extension of L3 compression fracture which was seen at that time. Further imaging revealed no definite fractures of hips/femurs, but MRI was recommended if high suspicion of fracture given degree of osteopenia. Orthopedics and neurosurgery were consulted.   CT head/cervical spine were negative for acute pathology. Patient denies headache, neck pain, or visual changes. She does endorse mild tenderness to palpation of spine in lower thoracic/upper lumbar region as well as tenderness to palpation over left hip. No other painful areas elicited on exam.      Ms. Rob will be admitted to the Trauma service for management of compression fractures and pain control. She will likely not be able to return to her independent apartment given recent falls and now decreased mobility. Of note, the patient has a history of paranoid delusions, particularly at night. Patient may been bedside attendant for safety while admitted.        Traumatic Injury  The patient sustained the above injury as a result of presumed fall from standing.  She was seen by the Trauma Resource Nurse and injury prevention education was performed.  The mechanism of injury and factors contributing to the accident were discussed with the patient.  Strategies on how to prevent future accidents were reviewed.  The patient underwent tertiary examination to evaluate for additional injuries.  The systematic review did not find any other injuries.    L3/L4 Compression Fracture:  Tasha Rob was evaluated by Neurosurgery and managed non-operatively for her fractures. She was placed on spinal precautions and monitored with serial neurological examinations which remained stable. Orthotics was consulted for a thoraco-lumbar orthotic brace (TLSO).  Patient had post bracing and mobilization films performed  and reviewed by Neurosurgery. She will need to wear the TLSO brace whenever the head of bed is greater than 30 degrees and with any activity out of bed. She is recommended to Weight bearing as tolerated. Neurosurgery recommends follow up in Neurosurgery clinic in 3 months with upright films to assess the compression fracture at that time. She was evaluated by physical and occupational therapies during his/her hospitalization.  Please see summary of most current therapy notes below.     Chronic constipation  Ms. Rob has a history of chronic constipation which was exacerbated during this admission due to significantly decreased mobility and the addition of narcotic oral analgesia.  She was prescribed stool softeners and laxatives on admission.  These medications were given intermittently and were not effective in relieving constipation.  She was then prescribed daily suppositories and PRN pink lady enemas.  She was then able to have a bowel movement.  It is anticipated that the patient will be able to wean off the daily suppository and transition to PRN suppository when mobility improves and analgesia is reduced.      Acute pain  Pain was controlled with a multi-modality approach.  The current regimen for Tasha Rob includes the following, scheduled acetaminophen, topical analgesic, gabapentin at HS, and PRN short acting opioids.  Adequate pain control was difficult to achieve.  Calcitonin nasal spray was prescribed at discharge and is to be continued for 10 days.   We anticipate that she will taper off this regimen over the next several weeks.  Additionally, the TLSO brace or soft brace should be worn when patient is out of bed to help alleviate pain.    Anxiety disorder  Ms. Rob has a history of anxiety disorder, as well as advanced dementia.  Her anxiety was exacerbated by being in an unfamiliar environment with unfamiliar routine. As a result, her PTA clonazepam was increased during this hospital stay  from 0.25mg twice daily to 0.5mg every 8 hours.  This medication change was somewhat helpful in reducing severity of anxiety.      Vertigo/Falls:   Patient has a history of vertigo, which has likely contributed to past falls.  She has been experiencing vertigo while inpatient, which has limited participation in therapies.  Patient has had PTA PRN Meclizine available for dizziness. It is recommended that patient continue to take Meclizine for vertigo while working with therapies.      Dementia  Ms. Rob has a history of advanced dementia.  The severity of Ms. Rob's dementia has made it more difficult for Ms. Rob to participate in her care and rehabilitation.  It is unclear if she will tolerate PT/OT for rehabilitation at this time.  She will be discharged with ordered PT/OT in the hopes that pain will improve and she will be able to tolerate therapy sessions.  She, and LEONARD Vásquez, met with Palliative care during this admission to discuss plans going forward.  Ms. Rob does not meet hospice criteria at time of discharge.  Ms. Rob will be discharged to a memory care unit.  All attempts will be made to help patient progress to her previous baseline functional status.  If she continues to experience decline, another assessment can be made to re-evaluate for hospice criteria.     Other Comobidities:  History of GERD, CVA, HTN were not active issues during this hospitalization. Her home medications were resumed during their hospitalization and no changes have been made.    Therapy Recommendations:   Current status of physical therapies on discharge:   3/9/2017: Pt participated in PT session with encouragement from POA. Pt needed max A of 2 to transfer sup<>sit and sit<>stand mod to max A of 2. Pt speaking non-sensically at times, yelling out also at times. Pt's POA is supportive and encouraging, reports pt does have episodes of vertigo at times. First attempt to stand pt wearing new TLSO, yelling out with discomfort  with the brace. Switched to pt's soft brace as PA states brace is for comfort only. During second stand pt began yelling out to lay down and PT/tech lowering bed to assist pt to sit when pt's knees bent quickly, pt sat on bed, stopped yelling suddenly and stopped answering PT. As soon as pt seated and PT/tech assisting pt into bed pt began talking again, /74 as soon as supine. Difficult to assess if this sudden instance of not talking and sitting quickly was behavioral or due to syncopal episode. RN reports previous instances of pt suddenly stopping talking or responding to staff when did not want to take certain medication.   Pt needed Max to total A of 2 stand>sit>supine with this situation today with PT. Will continue to progress functional mobility as able. Recommend discharge to TCU.    Current status of occupational therapies on discharge:  3/8/2017: OT will try to see 5x/week, pending mentation and behaviors. Trauma service to report back to staff whether TLSO brace is actually needed or not, seeing how neurosurgery note indicates that it is for comfort, but patient not tolerating it. Recommend discharge to SNF with close 24 hour care when able.    Significant Results and Procedures   DATE:   * No surgery found *  Surgeon(s): * Surgery not found *  Non-operative procedures None performed    Pending Results   These results will be followed up by   Unresulted Labs Ordered in the Past 30 Days of this Admission     No orders found from 1/5/2017 to 3/7/2017.        Code Status   DNR / DNI  Physical Exam   Temp: 96.1  F (35.6  C) Temp src: Axillary BP: 132/64   Heart Rate: 71 Resp: 16 SpO2: 92 % O2 Device: None (Room air)    Vitals:    03/06/17 1638   Weight: 69.8 kg (153 lb 14.1 oz)     Vital Signs with Ranges  Temp:  [96  F (35.6  C)-98.7  F (37.1  C)] 96.1  F (35.6  C)  Heart Rate:  [] 71  Resp:  [16-20] 16  BP: (100-156)/(51-80) 132/64  SpO2:  [91 %-96 %] 92 %  I/O last 3 completed shifts:  In: 680  "[P.O.:680]  Out: -     Matthias Coma Scale - Total 14/15   Constitutional: Awake, alert, agitated; calling out   Eyes: Lids and lashes normal, pupils equal, round and reactive to light, extra ocular muscles intact, sclera clear, conjunctiva normal.  ENT: Normocephalic, atraumatic  Respiratory: No increased work of breathing, good air exchange, clear to auscultation bilaterally, no crackles or wheezing.   Cardiovascular: regular rate and rhythm, several irregular beats noted, normal S1 and S2, no S3 or S4, and no murmur noted.  GI: Normal bowel sounds, soft, non-distended, non-tender, no guarding. BM 3/9  Genitourinary: No urine to assess.   Skin: Normal skin color, no redness, warmth, or swelling, no ecchymosis, no abrasions, and no jaundice.  Musculoskeletal: There is tenderness of both hips on palpation. Pain in bilateral knees with PROM. Low back pain noted while turning/repositioning. Pedal pulse palpated. CMS intact   Neurologic: Awake, alert, oriented to self only. Cranial nerves II-XII are grossly intact. Strength and sensory is intact. No focal deficits.   Neuropsychiatric: Anxious/agitated, normal eye contact, alert    Discharge Disposition   Discharged to long-term care facility  Condition at discharge: Stable  Discharge VS: Blood pressure 142/80, pulse 87, temperature 96  F (35.6  C), temperature source Oral, resp. rate 16, height 1.702 m (5' 7\"), weight 69.8 kg (153 lb 14.1 oz), SpO2 91 %, not currently breastfeeding.    Consultations This Hospital Stay   NEUROSURGERY IP CONSULT  ORTHOPEDIC SURGERY IP CONSULT  OCCUPATIONAL THERAPY ADULT IP CONSULT  PHYSICAL THERAPY ADULT IP CONSULT  SPIRITUAL HEALTH SERVICES IP CONSULT  MEDICATION HISTORY IP PHARMACY CONSULT  ORTHOSIS BRACE IP CONSULT  SPIRITUAL HEALTH SERVICES IP CONSULT  PHYSICAL THERAPY ADULT IP CONSULT  OCCUPATIONAL THERAPY ADULT IP CONSULT  OCCUPATIONAL THERAPY ADULT IP CONSULT  PHYSICAL THERAPY ADULT IP CONSULT  PALLIATIVE CARE ADULT    Discharge " Orders     General info for SNF   Length of Stay Estimate: Short Term Care: Estimated # of Days <30  Condition at Discharge: Stable  Level of care:skilled   Rehabilitation Potential: Good  Admission H&P remains valid and up-to-date: Yes  Recent Chemotherapy: N/A  Use Nursing Home Standing Orders: Yes     Mantoux instructions   Give two-step Mantoux (PPD) Per Facility Policy Yes     Activity - Up with assistive device   Chair back LSO brace for comfort when out of bed, ambulating.     Encourage PO fluids     Reason for your hospital stay   You were hospitalized and treated for the following conditions:  Trauma mechanism:Presumed fall from standing   Time/date of injury: unknown; possibly Friday night   Known Injuries:  1. L4 compression fracture   2. Progression of L3 compression fracture since previous exam   Other diagnoses:   1. Acute pain   2. Alzheimer's Dementia  3. CVA  4. HLD  5. HTN  6. Chronic low back pain/sciatica  7. Generalized anxiety disorder  8. GERD   9. Vertigo  10. Chronic constipation     You were seen by the following services:  Trauma Service  Neurosurgery   Physical and Occupational therapies     Follow Up and recommended labs and tests   1. Follow up with your primary care provider for continued medical care and hospital follow up in 5-10 days.     2. Trauma Clinic as needed.   [facility]  Floor 4  [address] [place] [address]   Appointments: [phone]     3. Neurosurgery Clinic in 3 months with upright lumbar XR.   Floor 3   [address] [place] [address]   Appointments: [phone]     DNR/DNI     Occupational Therapy Adult Consult   Evaluate and treat as clinically indicated.    Reason:  L3/L4 compression fractures     Physical Therapy Adult Consult   Evaluate and treat as clinically indicated.    Reason:  L3, L4 compression fractures, LSO brace for comfort     Fall precautions     Advance Diet as Tolerated   Follow this diet upon  discharge: Orders Placed This Encounter     Snacks/Supplements Adult: Ensure Plus Adult Shake; Between Meals     Regular Diet Adult       Discharge Medications   Current Discharge Medication List      START taking these medications    Details   oxyCODONE (ROXICODONE) 5 MG IR tablet Take 1-2 tablets (5-10 mg) by mouth every 4 hours as needed for moderate to severe pain  Qty: 60 tablet, Refills: 0    Associated Diagnoses: Compression fx, lumbar spine, closed, initial encounter (H)      gabapentin (NEURONTIN) 300 MG capsule Take 1 capsule (300 mg) by mouth At Bedtime  Qty: 60 capsule    Associated Diagnoses: Sciatica of left side      lidocaine (LIDODERM) 5 % Patch Place 2 patches onto the skin every 24 hours Apply to thoracic spine or Left hip  Qty: 30 patch    Associated Diagnoses: Compression fx, lumbar spine, closed, initial encounter (H)      bisacodyl (DULCOLAX) 10 MG Suppository Place 1 suppository (10 mg) rectally daily  Qty: 30 suppository    Comments: Hold if having bowel movements, loose stools  Associated Diagnoses: Drug-induced constipation      Heparin Sodium, Porcine, (HEPARIN SODIUM PF) 5000 UNIT/0.5ML injection Inject 0.5 mLs (5,000 Units) Subcutaneous every 12 hours  Qty: 14 Syringe    Comments: Continue for 1 week for DVT prophylaxis  Associated Diagnoses: Compression fx, lumbar spine, closed, initial encounter (H)      cholecalciferol 2000 UNITS tablet Take 2,000 Units by mouth daily  Qty: 60 tablet    Associated Diagnoses: Vitamin D deficiency         CONTINUE these medications which have CHANGED    Details   acetaminophen (TYLENOL) 325 MG tablet Take 3 tablets (975 mg) by mouth every 8 hours  Qty: 100 tablet    Comments: Continue for 10 days, then take as needed  Associated Diagnoses: Compression fx, lumbar spine, closed, initial encounter (H)      meclizine (ANTIVERT) 25 MG tablet Take 1 tablet (25 mg) by mouth 3 times daily as needed  Qty: 90 tablet, Refills: 3    Associated Diagnoses: Vertigo       ondansetron (ZOFRAN ODT) 4 MG ODT tab Take 1-2 tablets (4-8 mg) by mouth every 8 hours as needed for nausea or vomiting  Qty: 30 tablet, Refills: 0    Associated Diagnoses: Vertigo      polyethylene glycol (MIRALAX) powder Take 17 g by mouth daily  Qty: 500 g, Refills: 11    Associated Diagnoses: Slow transit constipation      senna-docusate (DELLA-COLACE) 8.6-50 MG per tablet Take 2 tablets by mouth 2 times daily  Qty: 180 tablet, Refills: 3    Associated Diagnoses: Slow transit constipation      memantine (NAMENDA) 10 MG tablet Take 1 tablet (10 mg) by mouth 2 times daily  Qty: 180 tablet, Refills: 3    Associated Diagnoses: Late onset Alzheimer's disease with behavioral disturbance      omeprazole (PRILOSEC) 20 MG CR capsule Take 1 capsule (20 mg) by mouth daily  Qty: 90 capsule, Refills: 3    Associated Diagnoses: Gastroesophageal reflux disease without esophagitis      montelukast (SINGULAIR) 10 MG tablet Take 1 tablet (10 mg) by mouth daily  Qty: 90 tablet, Refills: 3    Associated Diagnoses: Mild persistent asthma, uncomplicated      albuterol (ALBUTEROL) 108 (90 BASE) MCG/ACT Inhaler Inhale 2 puffs into the lungs every 4 hours as needed for shortness of breath / dyspnea or wheezing  Qty: 1 Inhaler, Refills: 11    Associated Diagnoses: Mild persistent asthma, uncomplicated      alum & mag hydroxide-simethicone (MYLANTA ES/MAALOX  ES) 400-400-40 MG/5ML SUSP suspension Take 30 mLs by mouth every 4 hours as needed for indigestion or heartburn  Qty: 769 mL, Refills: 11    Associated Diagnoses: Gastroesophageal reflux disease without esophagitis      clonazePAM (KLONOPIN) 0.5 MG tablet Take 1 tablet (0.5 mg) by mouth 3 times daily as needed for anxiety  Qty: 60 tablet, Refills: 0    Associated Diagnoses: Late onset Alzheimer's disease with behavioral disturbance      aspirin 162 MG EC tablet Take 1 tablet (162 mg) by mouth daily  Qty: 90 tablet, Refills: 0    Associated Diagnoses: Cerebrovascular disease          CONTINUE these medications which have NOT CHANGED    Details   STATIN NOT PRESCRIBED, INTENTIONAL, 1 each daily Statin not prescribed intentionally due to Intolerance (with supporting documentation of trying a statin at least once within the last 5 years)  Qty: 0 each, Refills: 0    Associated Diagnoses: Hypercholesterolemia         STOP taking these medications       Calcium Carbonate (CALCIUM 600 PO) Comments:   Reason for Stopping:         bisacodyl (DULCOLAX) 5 MG EC tablet Comments:   Reason for Stopping:         calcium Citrate-vitamin D 500-400 MG-UNIT CHEW Comments:   Reason for Stopping:             Allergies   Allergies   Allergen Reactions     Codeine Sulfate      Darvon [Propoxyphene Hcl]      Declomycin [Demeclocycline Hcl]      Fosamax GI Disturbance     Iodine      Ivp Dye [Contrast Dye]      Premarin [Conjugated Estrogens]      Shellfish-Derived Products      Sulfa Drugs      Data   Most Recent 3 CBC's:  Recent Labs   Lab Test  03/09/17   1145  03/07/17   0717  03/06/17   1742  03/06/17   1327  12/28/16   1545   WBC   --   6.5   --   9.5  7.6   HGB   --   12.6   --   12.6  13.5   MCV   --   93   --   94  93   PLT  168  135*  120*  133*  241      Most Recent 3 BMP's:  Recent Labs   Lab Test  03/07/17   0717  03/06/17   1327  12/28/16   1545   NA  140  141  140   POTASSIUM  3.8  4.1  4.3   CHLORIDE  104  106  105   CO2  27  26  31   BUN  17  19  24   CR  0.58  0.57  0.86   ANIONGAP  9  9  4.4   ROGELIO  9.2  9.0  8.8   GLC  96  105*  120*     Most Recent 2 LFT's:  Recent Labs   Lab Test  12/28/16   1545  07/27/16   1034   AST  16  19   ALT  81*  31   ALKPHOS  158*  143   BILITOTAL  0.3  0.7     Most Recent INR's and Anticoagulation Dosing History:  Anticoagulation Dose History     Recent Dosing and Labs Latest Ref Rng & Units 4/27/2010 7/26/2010 12/7/2011 8/17/2013    INR 0.86 - 1.14 1.04 1.02 1.01 0.96        Most Recent 3 Troponin's:  Recent Labs   Lab Test  08/17/13   0240  12/04/11   1050  12/03/11    2245   TROPI  <0.012  0.150*  0.184*     Most Recent 6 Bacteria Isolates From Any Culture (See EPIC Reports for Culture Details):  Recent Labs   Lab Test  12/28/16   1623  08/20/16   1251  07/27/16   1131   CULT  <10,000 colonies/mL mixed urogenital kay  <10,000 colonies/mL mixed urogenital kay Susceptibility testing not routinely   done    10,000 to 50,000 colonies/mL mixed urogenital kay Susceptibility testing not   routinely done       Most Recent TSH, T4 and A1c Labs:  Recent Labs   Lab Test  12/28/16   1545   04/15/13   1001   TSH  1.06   < >  1.42   A1C   --    --   6.7*    < > = values in this interval not displayed.     Results for orders placed or performed during the hospital encounter of 03/06/17   Head CT w/o contrast    Narrative    CT HEAD W/O CONTRAST 3/6/2017 12:38 PM    History: trauma    Comparison: Brain MRI 8/17/2013.    Technique: Using multidetector thin collimation helical acquisition  technique, axial, coronal and sagittal CT images from the skull base  to the vertex were obtained without intravenous contrast.     Findings:    No significant change in multiple chronic right cerebellar hemisphere  and bilateral (left greater than right) basal ganglia lacunar  infarcts. No new loss of gray-white matter differentiation. Stable  mild generalized cerebral and cerebellar parenchymal volume loss.  Ventricles are not enlarged out of proportion to the cerebral sulci.  No intracranial hemorrhage, mass effect, midline shift or abnormal  extra axial fluid collection.    Calvarium is intact. Paranasal sinuses and mastoid air cells are  clear.      Impression    Impression:   1. No acute intracranial pathology.  2. Stable moderate chronic small vessel ischemic disease and mild  generalized parenchymal volume loss.    CHANDRA DICKSON MD   Cervical spine CT w/o contrast    Narrative    CT CERVICAL SPINE W/O CONTRAST 3/6/2017 12:38 PM    Provided History: pain/trauma    Comparison: None  available.    Technique: Using multidetector thin collimation helical acquisition  technique, axial, coronal and sagittal CT images through the cervical  spine were obtained without intravenous contrast.     Findings:    Normal alignment of the cervical vertebrae. No fracture or traumatic  subluxation. No abnormal prevertebral soft tissue swelling.    Normal cervical lordosis. Exaggeration of the normal thoracic  kyphosis. Degenerative fusion of the posterior elements bilaterally at  C2-C3 and C4-C6. Multilevel disc degeneration with loss of multilevel  facet hypertrophy and uncinate spurring, which results in moderate  right neural foraminal stenosis at C6-C7. Disc height, disc  calcification and opposing endplate sclerosis at C6-C7. Spinal canal  is patent. Paraspinous soft tissues are normal. Visualized lung apices  are clear.      Impression    IMPRESSION:  1. No evidence of fracture or traumatic subluxation of the cervical  spine.  2. Multilevel cervical spondylosis.    CHANDRA DICKSON MD   Lumbar spine XR, 2-3 views     Value    Radiologist flags (Urgent)     New L4 superior endplate compression fractures since    Narrative    2 views lumbar spine radiographs    History: trauma/pain    Comparison: 5/31/2016    Findings:    AP and lateral  views of the lumbar spine were obtained.    5  lumbar type vertebral bodies are assumed for the purpose of this  dictation. Posterior elements of spine are not well assessed due to  underpenetration and incomplete inclusion in the field-of-view.    Since comparison study from May 2016, new superior endplate  compression fracture of L4. The superior endplate compression  deformity of L3 has also progressed.    Multilevel degenerative changes of spine with moderate to severe disc  space loss at L5-S1. Trace anterolisthesis of L4 on L5.    Bones appear osteopenic. Atherosclerotic calcification of aorta.      Impression    IMPRESSION:  1. Since May 2016, new L4 superior  endplate compression fracture and  progressed L3 superior endplate compression fracture.  2. Osteopenia.    [Urgent Result: New L4 superior endplate compression fractures since  comparison.]    Finding was identified on 3/6/2017 1:16 PM.     Dr. Beaver was contacted by Dr. Hair at 3/6/2017 1:34 PM and  verbalized understanding of the urgent finding.     STEPHANY HAIR   Pelvis XR, 1-2 views    Narrative    Exam: Single AP view of pelvis radiograph    HISTORY: Pain/trauma.    FINDINGS:    Single AP view of pelvis demonstrates no acute osseous abnormality.    Degenerative changes of visualized lumbar spine. Sacrum and coccyx are  obscured by moderate to large amount of fecal content in bowel gas.  Bones appear osteopenic.    Mild to moderate degenerative changes of bilateral hips with  associated joint space loss superiomedially. Enthesopathic changes of  the pelvis and trochanters. Pelvic phlebolith.      Impression    IMPRESSION:  1. No acute osseous abnormality.  2. Osteopenic appearance.    STEPHANY HAIR   XR Tibia & Fibula Bilateral 2 Views    Narrative    Exam: 2 views bilateral femur, 2 views bilateral tibia/fibular  radiographs    HISTORY: Pain/trauma.    COMPARISON: None.    AP and lateral views of the each femur and tibia/fibular were  obtained.    Bones appear osteopenic.    Right:    Degenerative changes of right hip. Mild contour irregularity of the  medial aspect of the femoral head neck junction on frog leg lateral  views are presumably related to osteophyte. Degenerative changes of  right knee.    Vascular calcifications. Plantar calcaneal enthesophyte.    Left: Degenerative changes of the left hip. Mild contour irregularity  of the medial aspect of the femoral head neck junction on frog leg  lateral view are presumably related to osteophyte. Degenerative  changes of left knee.    Bony proliferation of medial malleolus, in keeping with remote trauma.  Vascular calcifications.       Impression    IMPRESSION:  1. No definite evidence of acute osseous abnormality in either femur,  tibia, or fibula.  2. Mild contour irregularity of the medial aspect of the femoral head  neck junction on frog leg lateral views are presumably related to  osteophyte. However, if high clinical suspicion of hip fracture,  consider MRI given underlying osteopenia.    Findings discussed with Dr. Beaver by Dr. Hair.    STEPHANY HAIR   XR Femur Bilateral 2 Views    Narrative    Exam: 2 views bilateral femur, 2 views bilateral tibia/fibular  radiographs    HISTORY: Pain/trauma.    COMPARISON: None.    AP and lateral views of the each femur and tibia/fibular were  obtained.    Bones appear osteopenic.    Right:    Degenerative changes of right hip. Mild contour irregularity of the  medial aspect of the femoral head neck junction on frog leg lateral  views are presumably related to osteophyte. Degenerative changes of  right knee.    Vascular calcifications. Plantar calcaneal enthesophyte.    Left: Degenerative changes of the left hip. Mild contour irregularity  of the medial aspect of the femoral head neck junction on frog leg  lateral view are presumably related to osteophyte. Degenerative  changes of left knee.    Bony proliferation of medial malleolus, in keeping with remote trauma.  Vascular calcifications.      Impression    IMPRESSION:  1. No definite evidence of acute osseous abnormality in either femur,  tibia, or fibula.  2. Mild contour irregularity of the medial aspect of the femoral head  neck junction on frog leg lateral views are presumably related to  osteophyte. However, if high clinical suspicion of hip fracture,  consider MRI given underlying osteopenia.    Findings discussed with Dr. Beaver by Dr. Hair.    STEPHANY HAIR   CT Lumbar Spine w/o Contrast    Narrative    CT LUMBAR SPINE W/O CONTRAST 3/6/2017 8:11 PM    History: eval for compression fracture.    Comparison: Lumbar spine plain  films 3/6/2017 and 5/31/2016.    Technique: Using multidetector thin collimation helical acquisition  technique, axial, coronal and sagittal CT images through the lumbar  spine were obtained without intravenous contrast.     Findings: There are 5 lumbar type vertebrae. Regarding alignment, the  lumbar spine alignment appears preserved. There is significant disc  narrowing at L4-5, L5-S1 with milder disc narrowing remaining lumbar  vertebral levels. There is significant anterior endplate osteophytic  spurring seen throughout the lumbar spine and superior endplate  Schmorl's nodes at L2-4. There is again seen anterior wedging  compression deformity at L4 which may is new, but age indeterminate  since the plain film dated 5/31/2016. The compression deformity along  the superior endplate of L3 is progressed compared to the 5/31/2016  study. Posterior endplate osteophytic spurring is predominantly seen  at L3-5.. Findings on a level by level basis are as follows:    T12-L1: Anterior endplate osteophytic spurring. No significant spinal  canal stenosis or neural foraminal stenosis.    L1-L2: Anterior endplate osteophytic spurring. Mild spinal canal  stenosis. Bilateral facet hypertrophy with mild left neural foraminal  stenosis.    L2-L3:  Anterior and posterior endplate osteophytic spurring with  effacement of the ventral thecal sac and mild spinal canal stenosis.  Facet hypertrophy with mild left neural foraminal stenosis. L2  anterior endplate Schmorl's node.    L3-L4: Anterior and posterior endplate osteophytic spurring with  effacement of ventral thecal sac and cord resulting in moderate spinal  canal stenosis.. Bilateral facet hypertrophy with moderate left and  mild right neural foraminal stenosis. L3 anterior endplate Schmorl's  node.    L4-L5: Mild calcification of the fibrosis minimal effacement of the  ventral thecal sac causing minimal spinal canal stenosis. Bilateral  facet hypertrophy with mild to moderate  right and mild left neural  foraminal stenosis.    L5-S1: Anterior and posterior endplate osteophytic spurring without  significant spinal canal stenosis. Facet hypertrophy with mild left  and moderate right neural foraminal stenosis..    The visualized adjacent paraspinous tissues are grossly within normal  limits. Atherosclerotic calcifications of the abdominal aorta without  aneurysmal dilatation.      Impression    Impression:     1. New anterior wedging compression fracture of the L4 vertebral body  compared to 5/31/2016 which is age indeterminant. Evaluation for point  tenderness could be of value.  2. Progression of the superior endplate fracture of L3 compared to  5/31/2016.    I have personally reviewed the examination and initial interpretation  and I agree with the findings.    DORI DEAN MD       Time Spent on this Encounter   I, Crystal Sierra, personally saw the patient today and spent greater than 30 minutes discharging this patient.    We appreciate the opportunity to care for your patient while in the hospital.  Should you have any questions about their injuries or this discharge summary our contact information is below.    Trauma Services  [facility]   Department of Critical Care and Acute Care Surgery  [address] [place] [id number]  Office: [phone]  Clinic Nurse: [phone]

## 2017-03-10 NOTE — PROGRESS NOTES
Social Work Services Discharge Note      Patient Name:  Tasha Rob     Anticipated Discharge Date:  Friday 3/10/17    Discharge Disposition:   [facility]:  [facility]   [address] [place]  [address]  Main: [phone]  Fax: [phone]    Following MD:  Drea Tsang [phone] and Dr. Paulina Victor.  Writer has given Drea's contact info to Ileana Rojas NP with request to do provider to provider handoff.     Additional Services/Equipment Arranged:  Reveal stretcher ride arranged for 1500.  PCS form completed and placed in pt's chart.     Patient / Family response to discharge plan:  CHEPE met with ara Vásquez's LEONARD, this morning and informed her of d/c plan and timing.  Thalia very aware of SNF staffing levels and tight resources at SNF's so is nervous re: pt discharging on a Friday.  She states she is not resistant to the d/c and knows there is not medical intervention requiring pt to remain hospitalized, but is concerned that pt will remain in bed due to lower PT/OT staffing levels at the SNF on the weekends.  Supportive listening provided.     Persons notified of above discharge plan:  LEONARD Vásquez; KARLIE Rojas; Dang Mclean RN; Lisa in Admissions at [facility]    Staff Discharge Instructions:  CHEPE faxed the discharge orders and signed hard scripts for any controlled substances to Lisa in Admissions at the fax number above.  Please print a packet and send with patient.     CTS Handoff completed:  YES    Medicare Notice of Rights provided to the patient/family:  NO    YIN Montanez covering for   [phone] phone  [phone] pager

## 2017-03-10 NOTE — PLAN OF CARE
Problem: Goal Outcome Summary  Goal: Goal Outcome Summary  OT 7B: Cancel - Pt agitated and very vocal, not appropriate to be seen for therapy this afternoon. Will reschedule pt for OT tomorrow.

## 2017-03-10 NOTE — PLAN OF CARE
Problem: Goal Outcome Summary  Goal: Goal Outcome Summary  PT: Pt participated in PT session with encouragement from POA. Pt needed max A of 2 to transfer sup<>sit and sit<>stand mod to max A of 2. Pt speaking non-sensically at times, yelling out also at times. Pt's POA is supportive and encouraging, reports pt does have episodes of vertigo at times. First attempt to stand pt wearing new TLSO, yelling out with discomfort with the brace. Switched to pt's soft brace as PA states brace is for comfort only. During second stand pt began yelling out to lay down and PT/tech lowering bed to assist pt to sit when pt's knees bent quickly, pt sat on bed, stopped yelling suddenly and stopped answering PT. As soon as pt seated and PT/tech assisting pt into bed pt began talking again, /74 as soon as supine. Difficult to assess if this sudden instance of not talking and sitting quickly was behavioral or due to syncopal episode. RN reports previous instances of pt suddenly stopping talking or responding to staff when did not want to take certain medication.   Pt needed Max to total A of 2 stand>sit>supine with this situation today with PT. Will continue to progress functional mobility as able. Recommend discharge to TCU.

## 2017-03-10 NOTE — CONSULTS
[facility]  Palliative Care Consultation         Tasha Rob MRN# [id number]   Age: 76 year old YOB: 1940   Date of Admission: 3/6/2017    Reason for consult: Goals of care       Requesting physician/service: Nicole Steiner with Trauma Surgery       Recommendations        Goals of Care: Thalia favors interventions which would improve Cam's overall quality of life.  Cam does not yet qualify for Hospice given predicted life expectancy >6months.   -No recommended changes to the plan of care  -Discussed w/ Thalia the signs for qualification into Hospice (such as weight loss, no longer meaningfully verbal, etc)  -Provided recommendations on how to access Hospice as an outpatient should this be indicated in the future    Symptoms: Agree with increased oxycodone dosing as undertreated pain could be worsening Cam's behaviors. Also agree with treatment of constipation today as this can further worsen behaviors in patients with dementia.      Disease Process/es & Symptoms     Back Pain related to L3/L4 compression fractures  Hip pain related to osteoarthritis  Dementia         Decision-Making & Goals of Care     Discussion/counseling today about prognosis/goals of care/decisions:   See above.  Thalia's goals for Cam are that she can comfortably sit in a chair and participate in mass or other social activities as this brings her niranjan.   Patient has a completed health care directive available in the chart (Y/N): Yes  Health care agent (only if patient has an available, complete HCD): Thalia Baker  Physician orders for life-sustaining treatment (POLST) form is on file  Code Status: Do not resusitate / Do not intubate   Patient has decision-making capacity (Y/N): No    Coordination of Care     Findings & plan of care discussed with: Primary team  Follow-up plan from palliative team: We will plan to sign off  Thank you for involving us in the patient's care.     Juliet  Tito NUÑEZ / Palliative Medicine / Pager [phone] / After-Hours Answering Service [phone] / Main Palliative Clinic - [facility] [phone] / [facility] Inpatient Team Consult Pager [phone] (answered 8am-430pm M-F)    Time spent: 40 minutes, with >50% devoted to counseling and/or coordination of care.          Chief Complaint     Pain         History of Present Illness     History obtained from: chart review    Tasha is a 76 year old woman with a history of Alzheimer's dementia, chronic low back pain who was admitted to [facility] on 3/6 with back and hip pain.  She was found to have new compression fractures (L4) with progression of L3 compression fracture.            Past Medical History:   Past Medical History   Diagnosis Date     Alzheimer's dementia with behavioral disturbance      Arthritis, hip      bilateral     Asthma      Chronic lumbar pain      Gastro-oesophageal reflux disease      High cholesterol      Lumbar degenerative disc disease      Sciatica      Unspecified cerebral artery occlusion with cerebral infarction      Vertigo               Past Surgical History:   Past Surgical History   Procedure Laterality Date     Cholecystectomy       Hysterectomy       Cholecystectomy       Facial nerve surgery   - Jaquez's neuroma R     Hysterectomy       Hc tooth extraction w/forcep                 Social/Spiritual History:     Living situation: Lives at the [facility]  Family system: Sister, parents all  in a 6 year period.  Never , no children.   Functional status (needs help with ADLs or IADLs): Was ambulatory prior to admission.   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   Activities/interests: Her [employer] and being social are very important to her.             Family History:   Family History   Problem Relation Age of Onset     CEREBROVASCULAR DISEASE Mother      CANCER Father      Family history not discussed            "Allergies:   Allergies   Allergen Reactions     Codeine Sulfate      Darvon [Propoxyphene Hcl]      Declomycin [Demeclocycline Hcl]      Fosamax GI Disturbance     Iodine      Ivp Dye [Contrast Dye]      Premarin [Conjugated Estrogens]      Shellfish-Derived Products      Sulfa Drugs               Medications:   I have reviewed this patient's medication profile and medications during this hospitalization.            Review of Systems:   The comprehensive review of systems could not be obtained due to patient's dementia.           Physical Exam:   Vitals were reviewed  Temp: 96.1  F (35.6  C) Temp src: Axillary BP: 100/51   Heart Rate: 71 Resp: 16 SpO2: 92 % O2 Device: None (Room air)    Much of the examination was deferred due to patient's intolerance to my visit.   General: Alert, comfortable but at times angry appearing woman, laying in bed.   ENT: MMM.   Resp: Unlabored on room air.   NeuroPsych: Believes that she is in an office, asks if I am here to kill her, and if I have a gun.  Grabs my wrists quite forcefully and does not let go, taking approximately 60 seconds to free myself from her grasp.  Full strength in bilateral upper extremities.    Skin: No concerning lesions on exposed surfaces.            Data Reviewed:   Cr 0.58    CT lumbar spine, 3/6/17  \"1. New anterior wedging compression fracture of the L4 vertebral body  compared to 5/31/2016 which is age indeterminant. Evaluation for point  tenderness could be of value.  2. Progression of the superior endplate fracture of L3 compared to  5/31/2016.\"      "

## 2017-03-10 NOTE — PLAN OF CARE
"Problem: Individualization  Goal: Patient Preferences  Outcome: Improving  ./58 (BP Location: Left arm)  Pulse 87  Temp 98.7  F (37.1  C) (Axillary)  Resp 16  Ht 1.702 m (5' 7\")  Wt 69.8 kg (153 lb 14.1 oz)  LMP  (LMP Unknown)  SpO2 96%  BMI 24.1 kg/m2      Limited Assessment as patient was calm and appeared to sleep well throughout the night; no complaints or concerns reported; incontinent and continent at times; anticipating discharge to memory care      "

## 2017-03-10 NOTE — PLAN OF CARE
Problem: Goal Outcome Summary  Goal: Goal Outcome Summary  Outcome: No Change  D/I: Confused. Oriented to self only. Crying and yelling out at the beginning of the shift. Drifted off to sleep after hand massage. Slept soundly for 2 hours. Refused supper. Wouldn't open her mouth and pushed the spoon away when food placed near her mouth. Taking medications in apple sauce and pudding. Agreed to drink a cup of water x 1. Incont of urine x 1. Repositioned x 3. Placed on her side, but doesn't stay there. Bed alarm on. Crying out and yelling again around 2030. Given Oxycodone for pain. Continued to cry out until about 2200. Given Klonopin. Sleeping at this time. The pt has some blanchable red marks from the on her right scapula from the  brace. Barrier cream applied.  P: Continue POC. Fall precautions. Total care with ADL's and activity. Alert MD of changes.

## 2017-03-10 NOTE — PLAN OF CARE
Problem: Goal Outcome Summary  Goal: Goal Outcome Summary  Outcome: Adequate for Discharge Date Met:  03/10/17  Vitals:     03/10/17 0300 03/10/17 0727 03/10/17 0950 03/10/17 1240   BP:   100/51 132/64 142/69   BP Location:   Left arm Left arm Right arm   Pulse:           Resp: 16 16   16   Temp:   96.1  F (35.6  C)   97.9  F (36.6  C)   TempSrc:   Axillary   Axillary   SpO2:   92%   96%   Weight:           Height:             Pt's pain controlled with prn oxycodone 5 mg x2. Given prn klonopin x1. Lidocaine patches removed. Disoriented to situation, place, and time. Bed alarm on. Repositioned q 2 hours. Poor oral intake. Xray done on lower extremities. Incontinent of urine, barrier cream applied. Suppository given this AM, no BM this shift. Discharge instructions reviewed with power of . Stated she had no further questions. Report given to nurse at [facility]. Discharged from floor at 1520.

## 2017-03-11 NOTE — PLAN OF CARE
Problem: Goal Outcome Summary  Goal: Goal Outcome Summary  Occupational Therapy Discharge Summary     Reason for therapy discharge:    Discharged to long term care facility.     Progress towards therapy goal(s). See goals on Care Plan in [facility] electronic health record for goal details.  Goals partially met.  Barriers to achieving goals:   discharge from facility.     Therapy recommendation(s):    Continued therapy is recommended.  Rationale/Recommendations:  to maximize strength/independence needed to complete ADLs.

## 2017-03-13 NOTE — PROGRESS NOTES
Clinic Care Coordination       Patient admitted to [facility] on 3/6/17 after a non witnessed fall. Patient has significant dementia with behavior issues. She was found to have a L 4 compression fracture with progression of L3 compression fracture from previous exam. Patient was in a memory care unit previous to admission and was ambulating independently.  Patient has POA that makes all decisions.  Palliative care was consulted and decision was made to transfer patient to LTC at [facility]. She will receive therapies.      Patient is now admitted to long term care. Clinic care coordination is not indicated.    Rahel Houser RN, CCM - Care Coordinator     3/13/2017    8:55 AM  [phone]

## 2017-03-16 NOTE — TELEPHONE ENCOUNTER
Left voice message asking she call [facility] for results. Since that will need to be followed by ordering doctor. Asked she call triage back if further questing re: message.

## 2017-03-16 NOTE — TELEPHONE ENCOUNTER
Reason for Call:  Other call back    Detailed comments: [facility] was seen at the [facility]. Looking for xray results of knees and some lab results (potassium). Please call to discuss    Phone Number Patient can be reached at: Other phone number:   [phone]    Best Time: Any    Can we leave a detailed message on this number? YES    Call taken on 3/16/2017 at 2:00 PM by MARY MENA

## 2023-08-08 NOTE — TELEPHONE ENCOUNTER
Controlled Substance Refill Request for Klonopin  Problem List Complete:  No     PROVIDER TO CONSIDER COMPLETION OF PROBLEM LIST AND OVERVIEW/CONTROLLED SUBSTANCE AGREEMENT    Last Written Prescription Date:  10-14-16  Last Fill Quantity: 90,   # refills: 0    Last Office Visit with [facility] care provider: 12-28-16    Future Office visit:     Controlled substance agreement on file: No.     Processing:  Fax Rx to 's pharmacy   checked in past 6 months?  Yes 1-11-17 no concerns     yes

## (undated) RX ORDER — HYDROMORPHONE HYDROCHLORIDE 1 MG/ML
INJECTION, SOLUTION INTRAMUSCULAR; INTRAVENOUS; SUBCUTANEOUS
Status: DISPENSED
Start: 2017-01-01